# Patient Record
Sex: FEMALE | Race: BLACK OR AFRICAN AMERICAN | NOT HISPANIC OR LATINO | Employment: UNEMPLOYED | URBAN - METROPOLITAN AREA
[De-identification: names, ages, dates, MRNs, and addresses within clinical notes are randomized per-mention and may not be internally consistent; named-entity substitution may affect disease eponyms.]

---

## 2017-01-06 ENCOUNTER — ALLSCRIPTS OFFICE VISIT (OUTPATIENT)
Dept: OTHER | Facility: OTHER | Age: 57
End: 2017-01-06

## 2017-01-06 DIAGNOSIS — Z12.31 ENCOUNTER FOR SCREENING MAMMOGRAM FOR MALIGNANT NEOPLASM OF BREAST: ICD-10-CM

## 2017-05-27 ENCOUNTER — HOSPITAL ENCOUNTER (EMERGENCY)
Facility: HOSPITAL | Age: 57
Discharge: HOME | End: 2017-05-27
Attending: EMERGENCY MEDICINE | Admitting: EMERGENCY MEDICINE

## 2017-05-27 VITALS
OXYGEN SATURATION: 98 % | HEART RATE: 70 BPM | SYSTOLIC BLOOD PRESSURE: 153 MMHG | TEMPERATURE: 96.9 F | RESPIRATION RATE: 16 BRPM | DIASTOLIC BLOOD PRESSURE: 91 MMHG

## 2017-05-27 DIAGNOSIS — I10 HYPERTENSION: Primary | ICD-10-CM

## 2017-05-27 DIAGNOSIS — R51.9 HEADACHE: ICD-10-CM

## 2017-05-27 LAB
BACTERIA UR QL AUTO: ABNORMAL /HPF
BILIRUB UR QL STRIP: NEGATIVE
CLARITY UR: CLEAR
COLOR UR: YELLOW
GLUCOSE SERPL-MCNC: 105 MG/DL (ref 65–140)
GLUCOSE UR STRIP-MCNC: NEGATIVE MG/DL
HGB UR QL STRIP.AUTO: ABNORMAL
KETONES UR STRIP-MCNC: NEGATIVE MG/DL
LEUKOCYTE ESTERASE UR QL STRIP: ABNORMAL
NITRITE UR QL STRIP: NEGATIVE
NON-SQ EPI CELLS URNS QL MICRO: ABNORMAL /HPF
PH UR STRIP.AUTO: 6.5 [PH] (ref 4.5–8)
PROT UR STRIP-MCNC: NEGATIVE MG/DL
RBC #/AREA URNS AUTO: ABNORMAL /HPF
SP GR UR STRIP.AUTO: 1.02 (ref 1–1.03)
UROBILINOGEN UR QL STRIP.AUTO: 0.2 E.U./DL
WBC #/AREA URNS AUTO: ABNORMAL /HPF

## 2017-05-27 PROCEDURE — 81002 URINALYSIS NONAUTO W/O SCOPE: CPT | Performed by: EMERGENCY MEDICINE

## 2017-05-27 PROCEDURE — 99283 EMERGENCY DEPT VISIT LOW MDM: CPT

## 2017-05-27 PROCEDURE — 82948 REAGENT STRIP/BLOOD GLUCOSE: CPT

## 2017-05-27 PROCEDURE — 81001 URINALYSIS AUTO W/SCOPE: CPT

## 2017-05-27 RX ORDER — ATENOLOL 25 MG/1
25 TABLET ORAL DAILY
Qty: 30 TABLET | Refills: 0 | Status: SHIPPED | OUTPATIENT
Start: 2017-05-27 | End: 2017-06-12 | Stop reason: ALTCHOICE

## 2017-06-12 ENCOUNTER — HOSPITAL ENCOUNTER (EMERGENCY)
Facility: HOSPITAL | Age: 57
Discharge: HOME | End: 2017-06-12
Attending: EMERGENCY MEDICINE | Admitting: EMERGENCY MEDICINE
Payer: COMMERCIAL

## 2017-06-12 VITALS
TEMPERATURE: 98 F | DIASTOLIC BLOOD PRESSURE: 77 MMHG | HEART RATE: 62 BPM | OXYGEN SATURATION: 99 % | WEIGHT: 200 LBS | SYSTOLIC BLOOD PRESSURE: 172 MMHG | RESPIRATION RATE: 16 BRPM

## 2017-06-12 DIAGNOSIS — Z76.0 ENCOUNTER FOR MEDICATION REFILL: Primary | ICD-10-CM

## 2017-06-12 DIAGNOSIS — I10 CHRONIC HYPERTENSION: ICD-10-CM

## 2017-06-12 PROCEDURE — 99283 EMERGENCY DEPT VISIT LOW MDM: CPT

## 2017-06-12 RX ORDER — ATENOLOL AND CHLORTHALIDONE TABLET 100; 25 MG/1; MG/1
1 TABLET ORAL DAILY
COMMUNITY
End: 2017-06-12

## 2017-06-12 RX ORDER — ATENOLOL AND CHLORTHALIDONE TABLET 100; 25 MG/1; MG/1
1 TABLET ORAL DAILY
Qty: 14 TABLET | Refills: 0 | Status: SHIPPED | OUTPATIENT
Start: 2017-06-12 | End: 2017-06-26

## 2018-01-11 NOTE — PROGRESS NOTES
Assessment    1  Positive PPD (245 51) (R76 11)    Plan  Positive PPD    · * XR CHEST PA & LATERAL; Status:Active; Requested for:30Mar2016;    · Follow-up PRN Evaluation and Treatment  Follow-up  Status: Complete  Done:  [id number]    Chief Complaint  Pt is here for physical exam for work  Needs script for chest xray for positive PPD on left forearm  Pt states she takes Atenolol with diuretic ? History of Present Illness  HPI: Sees Endocrine in [facility] and he Rx BP med  Had a positive PPD and needs a CXR  PPD always com back positive since 1976  Work PE form and needs CXR  Review of Systems    Constitutional: No fever, no chills, feels well, no tiredness, no recent weight gain or weight loss  Eyes: No complaints of eye pain, no red eyes, no eyesight problems, no discharge, no dry eyes, no itching of eyes  ENT: no complaints of earache, no loss of hearing, no nose bleeds, no nasal discharge, no sore throat, no hoarseness  Cardiovascular: No complaints of slow heart rate, no fast heart rate, no chest pain, no palpitations, no leg claudication, no lower extremity edema  Respiratory: No complaints of shortness of breath, no wheezing, no cough, no SOB on exertion, no orthopnea, no PND  Gastrointestinal: No complaints of abdominal pain, no constipation, no nausea or vomiting, no diarrhea, no bloody stools  Genitourinary: No complaints of dysuria, no incontinence, no pelvic pain, no dysmenorrhea, no vaginal discharge or bleeding  Musculoskeletal: No complaints of arthralgias, no myalgias, no joint swelling or stiffness, no limb pain or swelling  Integumentary: No complaints of skin rash or lesions, no itching, no skin wounds, no breast pain or lump  Neurological: No complaints of headache, no confusion, no convulsions, no numbness, no dizziness or fainting, no tingling, no limb weakness, no difficulty walking     Psychiatric: Not suicidal, no sleep disturbance, no anxiety or depression, no change in personality, no emotional problems  Endocrine: No complaints of proptosis, no hot flashes, no muscle weakness, no deepening of the voice, no feelings of weakness  Hematologic/Lymphatic: No complaints of swollen glands, no swollen glands in the neck, does not bleed easily, does not bruise easily  Active Problems    1  Asthma (493 90) (J45 909)   2  Benign essential hypertension (401 1) (I10)   3  DMII (diabetes mellitus, type 2) (250 00) (E11 9)   4  Ear ache (388 70) (H92 09)   5  Esophageal reflux (530 81) (K21 9)   6  Headache (784 0) (R51)   7  Hypercholesteremia (272 0) (E78 0)   8  Hypochloremia (276 9) (E87 8)   9  Hypokalemia (276 8) (E87 6)   10  Plantar fasciitis (728 71) (M72 2)   11  Shingles (053 9) (B02 9)   12  Vitamin D deficiency (268 9) (E55 9)    Past Medical History    · History of Asthma with acute exacerbation (493 92) (J45 901)   · History of Biceps tendonitis, unspecified laterality (726 12) (M75 20)   · History of Depression (311) (F32 9)   · History of Drug dependence (304 90) (F19 20)   · History of bronchitis (V12 69) (Z87 09)   · History of migraine (V12 49) (Z86 69)   · History of Joint pain, knee (719 46) (M25 569)   · History of Knee Sprain (844 9)   · History of Neck pain (723 1) (M54 2)   · History of Shoulder joint pain, unspecified laterality    Surgical History    · History of  Section   · History of Hysterectomy   · History of Incision And Drainage Of Skin Abscess Hand   · History of Tonsillectomy    Family History    · Family history of Hypertension (V17 49)    · Family history of Diabetes Mellitus (V18 0)   · Family history of Hypertension (V17 49)    Social History    · Never A Smoker   · Never Drank Alcohol    Current Meds   1  MetFORMIN HCl - 500 MG Oral Tablet; TAKE 1 TABLET TWICE DAILY; Therapy: (Recorded:2016) to Recorded    Allergies    1  Sulfa Drugs   2   Flagyl CAPS    3  Seasonal    Vitals   Recorded: 45HKB5567 12:23PM   Temperature 98 F, Oral   Heart Rate 90   Respiration 15   Systolic 672   Diastolic 78   Height 5 ft 4 in   Weight 191 lb    BMI Calculated 32 79   BSA Calculated 1 92   O2 Saturation 98     Physical Exam    Constitutional   General appearance: No acute distress, well appearing and well nourished  Eyes   Conjunctiva and lids: No swelling, erythema or discharge  Pupils and irises: Equal, round and reactive to light  Ears, Nose, Mouth, and Throat   External inspection of ears and nose: Normal     Otoscopic examination: Tympanic membranes translucent with normal light reflex  Canals patent without erythema  Oropharynx: Normal with no erythema, edema, exudate or lesions  Pulmonary   Respiratory effort: No increased work of breathing or signs of respiratory distress  Auscultation of lungs: Clear to auscultation  Cardiovascular   Palpation of heart: Normal PMI, no thrills  Auscultation of heart: Normal rate and rhythm, normal S1 and S2, without murmurs  Examination of extremities for edema and/or varicosities: Normal     Abdomen   Abdomen: Non-tender, no masses  Liver and spleen: No hepatomegaly or splenomegaly  Lymphatic   Palpation of lymph nodes in neck: No lymphadenopathy  Musculoskeletal   Gait and station: Normal     Digits and nails: Normal without clubbing or cyanosis  Inspection/palpation of joints, bones, and muscles: Normal     Skin   Skin and subcutaneous tissue: Normal without rashes or lesions  Neurologic   Reflexes: 2+ and symmetric  Psychiatric   Orientation to person, place, and time: Normal     Mood and affect: Normal        Health Management  Benign essential hypertension   (1) COMPREHENSIVE METABOLIC PANEL; every 1 year; Next Due: 42MWT3019; Overdue  (1) LIPID PANEL, FASTING; every 1 year; Next Due: 27NIC5802; Overdue  EKG/ECG- POC; every 1 year; Next Due: 53ICD6530; Overdue  DMII (diabetes mellitus, type 2)   (1) HEMOGLOBIN A1C; every 3 months;  Next Due: 66BMF9700; Overdue  (1) MICROALBUMIN CREATININE RATIO, RANDOM URINE; every 1 year; Next Due:  12VUZ2350; Overdue  *VB - Eye Exam; every 1 year; Next Due: 41BYE2192; Overdue  *VB-Foot Exam; every 1 year; Next Due: 49KGI6404; Overdue  Blood Glucose- POC; every 3 months; Last 86WXJ8084; Next Due: 03Asj2877; Overdue  Urine Dip Non-Automated- POC; every 3 months; Next Due: 14HDN8834;  Overdue    Signatures   Electronically signed by : Virginia Salvador DO; Mar 30 2016  1:50PM EST                       (Author)

## 2018-01-14 VITALS
BODY MASS INDEX: 33.29 KG/M2 | HEIGHT: 64 IN | SYSTOLIC BLOOD PRESSURE: 136 MMHG | RESPIRATION RATE: 16 BRPM | WEIGHT: 195 LBS | DIASTOLIC BLOOD PRESSURE: 78 MMHG | TEMPERATURE: 98.2 F | HEART RATE: 67 BPM

## 2018-01-16 NOTE — MISCELLANEOUS
Message    Date: 04/12/2016 03:40 PM,   Pt called to request for her chest xray result to be faxed to [address] [place] [address] Drive: Dr Omi Norton at [phone]  Ok to fax? Mayra Padilla faxed chest xray results through [employer] per Dr Dexter Miller instructions  VR/vfp        Active Problems    1  Asthma (493 90) (J45 909)   2  Benign essential hypertension (401 1) (I10)   3  DMII (diabetes mellitus, type 2) (250 00) (E11 9)   4  Ear ache (388 70) (H92 09)   5  Esophageal reflux (530 81) (K21 9)   6  Headache (784 0) (R51)   7  Hypercholesteremia (272 0) (E78 0)   8  Hypochloremia (276 9) (E87 8)   9  Hypokalemia (276 8) (E87 6)   10  Plantar fasciitis (728 71) (M72 2)   11  Positive PPD (795 51) (R76 11)   12  Shingles (053 9) (B02 9)   13  Vitamin D deficiency (268 9) (E55 9)    Current Meds   1  MetFORMIN HCl - 500 MG Oral Tablet; TAKE 1 TABLET TWICE DAILY; Therapy: (Recorded:30Mar2016) to Recorded    Allergies    1  Sulfa Drugs   2  Flagyl CAPS    3  Seasonal    Signatures   Electronically signed by : Litzy Pruett DO;  Apr 14 2016  8:07AM EST                       (Author)

## 2018-09-23 ENCOUNTER — HOSPITAL ENCOUNTER (EMERGENCY)
Facility: HOSPITAL | Age: 58
Discharge: HOME | End: 2018-09-23
Attending: EMERGENCY MEDICINE | Admitting: EMERGENCY MEDICINE

## 2018-09-23 ENCOUNTER — APPOINTMENT (EMERGENCY)
Dept: RADIOLOGY | Facility: HOSPITAL | Age: 58
End: 2018-09-23

## 2018-09-23 VITALS
BODY MASS INDEX: 34.33 KG/M2 | WEIGHT: 200 LBS | DIASTOLIC BLOOD PRESSURE: 60 MMHG | TEMPERATURE: 97.9 F | HEART RATE: 75 BPM | SYSTOLIC BLOOD PRESSURE: 129 MMHG | RESPIRATION RATE: 19 BRPM | OXYGEN SATURATION: 98 %

## 2018-09-23 DIAGNOSIS — M25.462 PAIN AND SWELLING OF LEFT KNEE: ICD-10-CM

## 2018-09-23 DIAGNOSIS — M25.562 PAIN AND SWELLING OF LEFT KNEE: ICD-10-CM

## 2018-09-23 DIAGNOSIS — M25.562 LEFT KNEE PAIN: Primary | ICD-10-CM

## 2018-09-23 PROCEDURE — 99283 EMERGENCY DEPT VISIT LOW MDM: CPT

## 2018-09-23 PROCEDURE — 96372 THER/PROPH/DIAG INJ SC/IM: CPT

## 2018-09-23 PROCEDURE — 73562 X-RAY EXAM OF KNEE 3: CPT

## 2018-09-23 RX ORDER — ALBUTEROL SULFATE 90 UG/1
2 AEROSOL, METERED RESPIRATORY (INHALATION) EVERY 6 HOURS
COMMUNITY
Start: 2018-04-25 | End: 2019-04-25

## 2018-09-23 RX ORDER — KETOROLAC TROMETHAMINE 30 MG/ML
15 INJECTION, SOLUTION INTRAMUSCULAR; INTRAVENOUS ONCE
Status: COMPLETED | OUTPATIENT
Start: 2018-09-23 | End: 2018-09-23

## 2018-09-23 RX ORDER — LISINOPRIL 5 MG/1
5 TABLET ORAL
COMMUNITY
Start: 2018-04-25 | End: 2019-04-25

## 2018-09-23 RX ORDER — NAPROXEN 500 MG/1
500 TABLET ORAL 2 TIMES DAILY WITH MEALS
Qty: 10 TABLET | Refills: 0 | Status: SHIPPED | OUTPATIENT
Start: 2018-09-23 | End: 2018-09-28

## 2018-09-23 RX ORDER — OMEPRAZOLE 20 MG/1
20 CAPSULE, DELAYED RELEASE ORAL
COMMUNITY
Start: 2018-04-25 | End: 2019-04-25

## 2018-09-23 RX ORDER — ATORVASTATIN CALCIUM 10 MG/1
10 TABLET, FILM COATED ORAL
COMMUNITY
Start: 2018-04-25 | End: 2019-04-25

## 2018-09-23 RX ORDER — CALCIUM POLYCARBOPHIL 625 MG 625 MG/1
625 TABLET ORAL
COMMUNITY
Start: 2018-04-25 | End: 2019-04-25

## 2018-09-23 RX ORDER — ACETAMINOPHEN 325 MG/1
650 TABLET ORAL ONCE
Status: COMPLETED | OUTPATIENT
Start: 2018-09-23 | End: 2018-09-23

## 2018-09-23 RX ORDER — FLUTICASONE PROPIONATE 50 MCG
SPRAY, SUSPENSION (ML) NASAL
COMMUNITY
Start: 2018-08-27

## 2018-09-23 RX ADMIN — ACETAMINOPHEN 650 MG: 325 TABLET, FILM COATED ORAL at 20:27

## 2018-09-23 RX ADMIN — KETOROLAC TROMETHAMINE 15 MG: 30 INJECTION, SOLUTION INTRAMUSCULAR at 20:27

## 2018-09-24 NOTE — DISCHARGE INSTRUCTIONS
Arthralgia   WHAT YOU NEED TO KNOW:   Arthralgia is pain in one or more joints, with no inflammation  It may be short-term and get better within 6 to 8 weeks  Arthralgia can be an early sign of arthritis  Arthralgia may be caused by a medical condition, such as a hormone disorder or a tumor  It may also be caused by an infection or injury  DISCHARGE INSTRUCTIONS:   Medicines: The following medicines may  be ordered for you:  · Acetaminophen  decreases pain  Ask how much to take and how often to take it  Follow directions  Acetaminophen can cause liver damage if not taken correctly  · NSAIDs  decrease pain and prevent swelling  Ask your healthcare provider which medicine is right for you  Ask how much to take and when to take it  Take as directed  NSAIDs can cause stomach bleeding and kidney problems if not taken correctly  · Pain relief cream  decreases pain  Use this cream as directed  · Take your medicine as directed  Contact your healthcare provider if you think your medicine is not helping or if you have side effects  Tell him of her if you are allergic to any medicine  Keep a list of the medicines, vitamins, and herbs you take  Include the amounts, and when and why you take them  Bring the list or the pill bottles to follow-up visits  Carry your medicine list with you in case of an emergency  Follow up with your healthcare provider or specialist as directed:  Write down your questions so you remember to ask them during your visits  Self-care:   · Apply heat  to help decrease pain  Use a heating pad or heat wrap  Apply heat for 20 to 30 minutes every 2 hours for as many days as directed  · Rest  as much as possible  Avoid activities that cause joint pain  · Apply ice  to help decrease swelling and pain  Ice may also help prevent tissue damage  Use an ice pack, or put crushed ice in a plastic bag   Cover it with a towel and place it on your painful joint for 15 to 20 minutes every hour or as directed  · Support  the joint with a brace or elastic wrap as directed  · Elevate  your joint above the level of your heart as often as you can to help decrease swelling and pain  Prop your painful joint on pillows or blankets to keep it elevated comfortably  · Lose weight  if you are overweight  Extra weight can put pressure on your joints and cause more pain  Ask your healthcare provider how much you should weigh  Ask him to help you create a weight loss plan  · Exercise  regularly to help improve joint movement and to decrease pain  Ask about the best exercise plan for you  Low-impact exercises can help take the pressure off your joints  Examples are walking, swimming, and water aerobics  Physical therapy:  A physical therapist teaches you exercises to help improve movement and strength, and to decrease pain  Ask your healthcare provider if physical therapy is right for you  Contact your healthcare provider or specialist if:   · You have a fever  · You continue to have joint pain that cannot be relieved with heat, ice, or medicine  · You have pain and inflammation around your joint  · You have questions or concerns about your condition or care  Return to the emergency department if:   · You have sudden, severe pain when you move your joint  · You have a fever and shaking chills  · You cannot move your joint  · You lose feeling on the side of your body where you have the painful joint  © 2017 [address]  Information is for End User's use only and may not be sold, redistributed or otherwise used for commercial purposes  All illustrations and images included in CareNotes® are the copyrighted property of A D A M [employer] Inc  or [employer]  The above information is an  only  It is not intended as medical advice for individual conditions or treatments   Talk to your doctor, nurse or pharmacist before following any medical regimen to see if it is safe and effective for you  Swollen Joint, Ambulatory Care   GENERAL INFORMATION:   Joint swelling  may occur in one or more joints  You may have other symptoms, such as pain, tenderness, or stiffness  A swollen joint may be caused by a variety of conditions such as arthritis, pseudogout, gout, tendinitis, or injury  Seek immediate care for the following symptoms:   · You cannot move your joint at all  · You have severe pain that does not get better with medicine  Treatment for a swollen joint  depends on the cause of your swollen joint  Your healthcare provider may recommend any of the following:  · Rest  your swollen joint  Avoid activities that make the swelling or pain worse  You may need to avoid putting weight on your joint while you have pain  Crutches or a walker can be used to avoid putting weight on joints in your lower body  · Apply ice  on your swollen joint for 15 to 20 minutes every hour or as directed  Use an ice pack, or put crushed ice in a plastic bag  Cover it with a towel  Ice helps prevent tissue damage and decreases swelling and pain  · Apply heat  on your swollen joint for 20 to 30 minutes every 2 hours for as many days as directed  Heat helps decrease pain  · Elevate  your swollen joint above the level of your heart as often as you can  This will help decrease swelling and pain  Prop your joint on pillows or blankets to keep it elevated comfortably  · NSAIDs  help decrease swelling and pain or fever  This medicine is available with or without a doctor's order  NSAIDs can cause stomach bleeding or kidney problems in certain people  If you take blood thinner medicine, always ask your healthcare provider if NSAIDs are safe for you  Always read the medicine label and follow directions  Follow up with your healthcare provider as directed:  Write down your questions so you remember to ask them during your visits     CARE AGREEMENT:   You have the right to help plan your care  Learn about your health condition and how it may be treated  Discuss treatment options with your caregivers to decide what care you want to receive  You always have the right to refuse treatment  The above information is an  only  It is not intended as medical advice for individual conditions or treatments  Talk to your doctor, nurse or pharmacist before following any medical regimen to see if it is safe and effective for you  © 2014 [address] is for End User's use only and may not be sold, redistributed or otherwise used for commercial purposes  All illustrations and images included in CareNotes® are the copyrighted property of [employer]  or [employer]

## 2018-09-24 NOTE — ED ATTENDING ATTESTATION
Jenna Yuan MD, saw and evaluated the patient  All available labs and X-rays were ordered by me or the resident and have been reviewed by myself  I discussed the patient with the resident / non-physician and agree with the resident's / non-physician practitioner's findings and plan as documented in the resident's / non-physician practicitioner's note, except where noted  At this point, I agree with the current assessment done in the ED  Chief Complaint   Patient presents with    Leg Swelling     Pain, swelling to LLE for 2 days  No known injury  This is a 65-year-old female presenting for evaluation of atraumatic left knee pain with swelling  The last 2 days she has been noticing that there is this persistent pain on the medial aspect of her left knee  She states that she woke up but 2 days ago with the pain, it would get better during the course of the day but then when she sleeps again wakes up in the morning it is much worse  No history of arthritis  Tylenol/motrin medications used with mild relief  She has noticed chronic lower extremity swelling bilaterally equal, not particularly worse in the last couple days  Because the symptoms are continuing she come in for evaluation  Denies fevers chills night sweats nausea vomiting chest pain shortness of breath  The pain is worse with movement, better with rest   If she stays perfectly still with her leg straight she has minimal pain  Denies any urinary symptoms  Denies history of gout  Denies new type of diet  PMH:  - HTN  - DM  - HLD  PSH:  - Hysterectomy  -   No smoking, drinking, drugs   PE:  Vitals:    18 1910 18 2117   BP: 153/68 129/60   BP Location: Right arm Right arm   Pulse: 87 75   Resp: 18 19   Temp: 97 9 °F (36 6 °C)    TempSrc: Temporal    SpO2: 99% 98%   Weight: 90 7 kg (200 lb)    General: VSS, NAD, awake, alert  Well-nourished, well-developed  Appears stated age     Speaking normally in full sentences  Head: Normocephalic, atraumatic, nontender  Eyes: PERRL, EOM-I  No diplopia  No hyphema  No subconjunctival hemorrhages  Symmetrical lids  ENT: Atraumatic external nose and ears  MMM  No malocclusion  No stridor  Normal phonation  No drooling  Normal swallowing  Neck: Symmetric, trachea midline  No JVD  CV: RRR  +S1/S2  No murmurs or gallops  Peripheral pulses +2 throughout  No chest wall tenderness  Lungs:   Unlabored No retractions  CTAB, lungs sounds equal bilateral    No tachypnea  Abd: +BS, soft, NT/ND    MSK:   FROM   EHL/FHL/PF/DF/KF/KE/HF/HE 5/5  No saddle anesthesia  2+ patellar reflexes  Knee: AD/PD/Varus/Valgus/Lachman 5/5 without demonstration of joint laxity  When I was doing Veress in valgus, the fact that my palm was pressing on the medial aspect of the knee elicits significant pain  There was no distal swelling that I can appreciate in the ankles or feet  Pain with movement  Tiny effusion  Back:   No rashes  Skin: Dry, intact  Neuro: AAOx3, GCS 15, CN II-XII grossly intact  Motor grossly intact  Psychiatric/Behavioral: Appropriate mood and affect   Exam: deferred  A:  - Knee pain  P:  - supportive measures  - XR for fx   - 13 point ROS was performed and all are normal unless stated in the history above  - Nursing note reviewed  Vitals reviewed  - Orders placed by myself and/or advanced practitioner / resident     - Previous chart was reviewed  - No language barrier    - History obtained from patient  - There are no limitations to the history obtained  - Critical care time: Not applicable for this patient  Final Diagnosis:  1  Left knee pain    2   Pain and swelling of left knee         Medications   ketorolac (TORADOL) injection 15 mg (15 mg Intramuscular Given 9/23/18 2027)   acetaminophen (TYLENOL) tablet 650 mg (650 mg Oral Given 9/23/18 2027)     XR knee 3 views left non injury   ED Interpretation   Medial osteoarthritis, patellar femoral accessory arthritis      Final Result      No acute osseous abnormality  Workstation performed: [id number]           Orders Placed This Encounter   Procedures    XR knee 3 views left non injury     Labs Reviewed - No data to display  Time reflects when diagnosis was documented in both MDM as applicable and the Disposition within this note     Time User Action Codes Description Comment    9/23/2018  9:38 PM Cyndee Porras Add [M25 562] Left knee pain     9/23/2018  9:38 PM Cristino Sherman Add [Q04 513,  M29 762] Pain and swelling of left knee       ED Disposition     ED Disposition Condition Comment    Discharge  Jeremi Esparza discharge to home/self care  Condition at discharge: Good    Return precautions were discussed with patient  Patient understands when to return to  Emergency department  Patient agrees to discharge plan and follow up care             Follow-up Information     Follow up With Specialties Details Why Contact Info Additional Information    Russel Jeter MD Internal Medicine Go in 2 days  [place] [id number]  [facility] Emergency Department Emergency Medicine Go to As needed, If symptoms worsen [address] [place] ED, [address]  , [facility] [place], [address] [facility] Go in 1 week  [facility] [address] [phone]         Discharge Medication List as of 9/23/2018  9:50 PM      START taking these medications    Details   naproxen (NAPROSYN) 500 mg tablet Take 1 tablet (500 mg total) by mouth 2 (two) times a day with meals for 5 days, Starting Sun 9/23/2018, Until Fri 9/28/2018, Print         CONTINUE these medications which have NOT CHANGED    Details   albuterol (PROVENTIL HFA,VENTOLIN HFA) 90 mcg/act inhaler Inhale 2 puffs every 6 (six) hours, Starting PEYTON Resendiz 4/25/2018, Until Thu 4/25/2019, Historical Med      atorvastatin (LIPITOR) 10 mg tablet Take 10 mg by mouth, Starting Wed 4/25/2018, Until Thu 4/25/2019, Historical Med      calcium polycarbophil (FIBERCON) 625 mg tablet Take 625 mg by mouth, Starting Wed 4/25/2018, Until Thu 4/25/2019, Historical Med      fluticasone (FLONASE) 50 mcg/act nasal spray instill 2 sprays into each nostril once daily, Historical Med      Linaclotide (LINZESS) 72 MCG CAPS Take by mouth, Historical Med      lisinopril (ZESTRIL) 5 mg tablet Take 5 mg by mouth, Starting Wed 4/25/2018, Until Thu 4/25/2019, Historical Med      omeprazole (PRILOSEC) 20 mg delayed release capsule Take 20 mg by mouth, Starting Wed 4/25/2018, Until Thu 4/25/2019, Historical Med      atenolol-chlorthalidone (TENORETIC) 100-25 mg per tablet Take 1 tablet by mouth daily for 14 days, Starting Mon 6/12/2017, Until Mon 6/26/2017, Print      metFORMIN (GLUCOPHAGE) 500 mg tablet Take 500 mg by mouth 2 (two) times a day, Historical Med           No discharge procedures on file  Prior to Admission Medications   Prescriptions Last Dose Informant Patient Reported? Taking?    Linaclotide (LINZESS) 72 MCG CAPS   Yes Yes   Sig: Take by mouth   albuterol (PROVENTIL HFA,VENTOLIN HFA) 90 mcg/act inhaler   Yes Yes   Sig: Inhale 2 puffs every 6 (six) hours   atenolol-chlorthalidone (TENORETIC) 100-25 mg per tablet   No No   Sig: Take 1 tablet by mouth daily for 14 days   atorvastatin (LIPITOR) 10 mg tablet   Yes Yes   Sig: Take 10 mg by mouth   calcium polycarbophil (FIBERCON) 625 mg tablet   Yes Yes   Sig: Take 625 mg by mouth   fluticasone (FLONASE) 50 mcg/act nasal spray   Yes Yes   Sig: instill 2 sprays into each nostril once daily   lisinopril (ZESTRIL) 5 mg tablet   Yes Yes   Sig: Take 5 mg by mouth   metFORMIN (GLUCOPHAGE) 500 mg tablet   Yes No   Sig: Take 500 mg by mouth 2 (two) times a day   omeprazole (PRILOSEC) 20 mg delayed release capsule   Yes Yes   Sig: Take 20 mg by mouth      Facility-Administered Medications: None       Portions of the record may have been created with voice recognition software  Occasional wrong word or "sound a like" substitutions may have occurred due to the inherent limitations of voice recognition software  Read the chart carefully and recognize, using context, where substitutions have occurred      Electronically signed by:  Sam Brower

## 2018-09-24 NOTE — ED PROVIDER NOTES
History  Chief Complaint   Patient presents with    Leg Swelling     Pain, swelling to LLE for 2 days  No known injury  HPI     58-year-old female presenting with left knee swelling for 2 days  Patient states she does have a history of arthritis in her back  Patient experience left knee pain  Pain is worse in the morning and in the knee warmed up throughout the day  She admits the medial pain  Currently the pain is a 5/10 worse with walking, 7/10  Patient denies fever chills rigors  Patient states she does have a history of bilateral lower extremity swelling which comes and goes this has been going on for years  No history DVT or PE in her past   Patient denies trauma to the area  Patient denies overlying skin changes  Pain is located medially and radiates to her popliteal fossa  Patient denies fever chills rigors headache lightheadedness dizziness chest pain palpitations shortness of breath cough pleurisy abdominal pain nausea vomiting diarrhea constipation urinary symptoms motor weakness numbness  Prior to Admission Medications   Prescriptions Last Dose Informant Patient Reported? Taking?    Linaclotide (LINZESS) 72 MCG CAPS   Yes Yes   Sig: Take by mouth   albuterol (PROVENTIL HFA,VENTOLIN HFA) 90 mcg/act inhaler   Yes Yes   Sig: Inhale 2 puffs every 6 (six) hours   atenolol-chlorthalidone (TENORETIC) 100-25 mg per tablet   No No   Sig: Take 1 tablet by mouth daily for 14 days   atorvastatin (LIPITOR) 10 mg tablet   Yes Yes   Sig: Take 10 mg by mouth   calcium polycarbophil (FIBERCON) 625 mg tablet   Yes Yes   Sig: Take 625 mg by mouth   fluticasone (FLONASE) 50 mcg/act nasal spray   Yes Yes   Sig: instill 2 sprays into each nostril once daily   lisinopril (ZESTRIL) 5 mg tablet   Yes Yes   Sig: Take 5 mg by mouth   metFORMIN (GLUCOPHAGE) 500 mg tablet   Yes No   Sig: Take 500 mg by mouth 2 (two) times a day   omeprazole (PRILOSEC) 20 mg delayed release capsule   Yes Yes   Sig: Take 20 mg by mouth      Facility-Administered Medications: None       Past Medical History:   Diagnosis Date    Diabetes mellitus ([address] )     Hyperlipidemia     Hypertension        Past Surgical History:   Procedure Laterality Date     SECTION      HYSTERECTOMY         History reviewed  No pertinent family history  I have reviewed and agree with the history as documented  Social History   Substance Use Topics    Smoking status: Never Smoker    Smokeless tobacco: Never Used    Alcohol use No        Review of Systems   Musculoskeletal: Positive for arthralgias and joint swelling  Negative for back pain, gait problem, myalgias, neck pain and neck stiffness  Skin: Negative for color change, pallor, rash and wound  All other systems reviewed and are negative  Physical Exam  ED Triage Vitals [18]   Temperature Pulse Respirations Blood Pressure SpO2   97 9 °F (36 6 °C) 87 18 153/68 99 %      Temp Source Heart Rate Source Patient Position - Orthostatic VS BP Location FiO2 (%)   Temporal Monitor Sitting Right arm --      Pain Score       8           Orthostatic Vital Signs  Vitals:    18   BP: 153/68 129/60   Pulse: 87 75   Patient Position - Orthostatic VS: Sitting Lying       Physical Exam   Constitutional: She is oriented to person, place, and time  She appears well-developed and well-nourished  No distress  HENT:   Head: Normocephalic and atraumatic  Right Ear: External ear normal    Left Ear: External ear normal    Nose: Nose normal    Mouth/Throat: Oropharynx is clear and moist  No oropharyngeal exudate  Eyes: Conjunctivae and EOM are normal  Pupils are equal, round, and reactive to light  Right eye exhibits no discharge  Left eye exhibits no discharge  No scleral icterus  Neck: Normal range of motion  Neck supple  No JVD present  No tracheal deviation present  No thyromegaly present     Cardiovascular: Normal rate, regular rhythm, S1 normal, S2 normal, normal heart sounds and intact distal pulses  No murmur heard  Pulses:       Dorsalis pedis pulses are 2+ on the right side, and 2+ on the left side  Posterior tibial pulses are 2+ on the right side, and 2+ on the left side  Pulmonary/Chest: Effort normal and breath sounds normal  No stridor  No respiratory distress  She has no wheezes  Abdominal: Soft  Bowel sounds are normal  She exhibits no distension and no mass  There is no tenderness  There is no rebound and no guarding  No hernia  Musculoskeletal: Normal range of motion  She exhibits no edema or deformity  Right knee: She exhibits normal range of motion, no swelling and no effusion  No tenderness found  No medial joint line, no lateral joint line, no MCL, no LCL and no patellar tendon tenderness noted  Left knee: She exhibits swelling  She exhibits normal range of motion and no effusion  Tenderness found  Medial joint line tenderness noted  No lateral joint line, no MCL, no LCL and no patellar tendon tenderness noted  Legs:  Homans sign negative bilaterally no calf erythema or edema, calves are symmetrical in diameter  Lymphadenopathy:     She has no cervical adenopathy  Neurological: She is alert and oriented to person, place, and time  She displays normal reflexes  No cranial nerve deficit  She exhibits normal muscle tone  Skin: Skin is warm and dry  No rash noted  She is not diaphoretic  No erythema  Psychiatric: She has a normal mood and affect  Her behavior is normal  Judgment and thought content normal    Nursing note and vitals reviewed        ED Medications  Medications   ketorolac (TORADOL) injection 15 mg (15 mg Intramuscular Given 9/23/18 2027)   acetaminophen (TYLENOL) tablet 650 mg (650 mg Oral Given 9/23/18 2027)       Diagnostic Studies  Results Reviewed     None                 XR knee 3 views left non injury   ED Interpretation by Hildegarde Severin, DO (09/23 2052)   Medial osteoarthritis, patellar femoral accessory arthritis            Procedures  Procedures      Phone Consults  ED Phone Contact    ED Course                               MDM  Number of Diagnoses or Management Options  Left knee pain:   Pain and swelling of left knee:   Diagnosis management comments: 71-year-old female presenting with left knee pain  Patient has mild effusion  Patient has no calf tenderness consider diagnosis of DVT clinically excluded  Patient has arthritic changes on x-ray  Patient given Toradol  Patient felt better  Impression left knee arthritis with effusion  No fracture seen no dislocation seen on x-ray  Patient will be started on naproxen  Orthopedic follow-up given  ED return precautions discussed  No signs of septic arthritis, patient feels constitutionally well  Patient is able to move knee, it is not locked  Doubt septic arthritis  CritCare Time    Disposition  Final diagnoses:   Left knee pain   Pain and swelling of left knee     Time reflects when diagnosis was documented in both MDM as applicable and the Disposition within this note     Time User Action Codes Description Comment    9/23/2018  9:38 PM Josefina Stack Add [M25 562] Left knee pain     9/23/2018  9:38 PM Jagjit Sherman Add [I40 214,  M22 462] Pain and swelling of left knee       ED Disposition     ED Disposition Condition Comment    Discharge  Marixa Cohen discharge to home/self care  Condition at discharge: Good    Return precautions were discussed with patient  Patient understands when to return to  Emergency department  Patient agrees to discharge plan and follow up care             Follow-up Information     Follow up With Specialties Details Why Contact Info Additional Information    Daniela Anglees MD Internal Medicine Go in 2 days  [place] [id number]  [address] [facility] Emergency Department Emergency Medicine Go to As needed, If symptoms worsen [address] [facility] [address] [facility] [address]   Ezekiel Carter [address] [place] [facility], [address] [facility]  , [address] [facility] [place], [address] [facility] [address] [facility] Orthopedic Surgery Go in 1 week  [facility] [id number] [phone]           Discharge Medication List as of 9/23/2018  9:50 PM      START taking these medications    Details   naproxen (NAPROSYN) 500 mg tablet Take 1 tablet (500 mg total) by mouth 2 (two) times a day with meals for 5 days, Starting Sun 9/23/2018, Until Fri 9/28/2018, Print         CONTINUE these medications which have NOT CHANGED    Details   albuterol (PROVENTIL HFA,VENTOLIN HFA) 90 mcg/act inhaler Inhale 2 puffs every 6 (six) hours, Starting Wed 4/25/2018, Until Thu 4/25/2019, Historical Med      atorvastatin (LIPITOR) 10 mg tablet Take 10 mg by mouth, Starting Wed 4/25/2018, Until Thu 4/25/2019, Historical Med      calcium polycarbophil (FIBERCON) 625 mg tablet Take 625 mg by mouth, Starting Wed 4/25/2018, Until Thu 4/25/2019, Historical Med      fluticasone (FLONASE) 50 mcg/act nasal spray instill 2 sprays into each nostril once daily, Historical Med      Linaclotide (LINZESS) 72 MCG CAPS Take by mouth, Historical Med      lisinopril (ZESTRIL) 5 mg tablet Take 5 mg by mouth, Starting Wed 4/25/2018, Until Thu 4/25/2019, Historical Med      omeprazole (PRILOSEC) 20 mg delayed release capsule Take 20 mg by mouth, Starting Wed 4/25/2018, Until Thu 4/25/2019, Historical Med      atenolol-chlorthalidone (TENORETIC) 100-25 mg per tablet Take 1 tablet by mouth daily for 14 days, Starting Mon 6/12/2017, Until Mon 6/26/2017, Print      metFORMIN (GLUCOPHAGE) 500 mg tablet Take 500 mg by mouth 2 (two) times a day, Historical Med           No discharge procedures on file  ED Provider  Attending physically available and evaluated Liz Kwan I managed the patient along with the ED Attending      Electronically Signed by         Rodríguez Farrell DO  09/24/18 0234 Statement Selected

## 2021-06-15 ENCOUNTER — APPOINTMENT (EMERGENCY)
Dept: RADIOLOGY | Facility: HOSPITAL | Age: 61
End: 2021-06-15
Payer: COMMERCIAL

## 2021-06-15 ENCOUNTER — HOSPITAL ENCOUNTER (EMERGENCY)
Facility: HOSPITAL | Age: 61
Discharge: HOME | End: 2021-06-15
Attending: EMERGENCY MEDICINE | Admitting: EMERGENCY MEDICINE
Payer: COMMERCIAL

## 2021-06-15 VITALS
HEART RATE: 83 BPM | SYSTOLIC BLOOD PRESSURE: 102 MMHG | TEMPERATURE: 98.8 F | RESPIRATION RATE: 20 BRPM | DIASTOLIC BLOOD PRESSURE: 52 MMHG | OXYGEN SATURATION: 100 %

## 2021-06-15 DIAGNOSIS — J06.9 URI (UPPER RESPIRATORY INFECTION): Primary | ICD-10-CM

## 2021-06-15 LAB
ATRIAL RATE: 81 BPM
P AXIS: 59 DEGREES
PR INTERVAL: 154 MS
QRS AXIS: 22 DEGREES
QRSD INTERVAL: 72 MS
QT INTERVAL: 372 MS
QTC INTERVAL: 432 MS
SARS-COV-2 RNA RESP QL NAA+PROBE: NEGATIVE
T WAVE AXIS: 9 DEGREES
VENTRICULAR RATE: 81 BPM

## 2021-06-15 PROCEDURE — U0003 INFECTIOUS AGENT DETECTION BY NUCLEIC ACID (DNA OR RNA); SEVERE ACUTE RESPIRATORY SYNDROME CORONAVIRUS 2 (SARS-COV-2) (CORONAVIRUS DISEASE [COVID-19]), AMPLIFIED PROBE TECHNIQUE, MAKING USE OF HIGH THROUGHPUT TECHNOLOGIES AS DESCRIBED BY CMS-2020-01-R: HCPCS | Performed by: EMERGENCY MEDICINE

## 2021-06-15 PROCEDURE — 93005 ELECTROCARDIOGRAM TRACING: CPT

## 2021-06-15 PROCEDURE — 96372 THER/PROPH/DIAG INJ SC/IM: CPT

## 2021-06-15 PROCEDURE — 71045 X-RAY EXAM CHEST 1 VIEW: CPT

## 2021-06-15 PROCEDURE — 99284 EMERGENCY DEPT VISIT MOD MDM: CPT

## 2021-06-15 PROCEDURE — U0005 INFEC AGEN DETEC AMPLI PROBE: HCPCS | Performed by: EMERGENCY MEDICINE

## 2021-06-15 PROCEDURE — 99285 EMERGENCY DEPT VISIT HI MDM: CPT | Performed by: EMERGENCY MEDICINE

## 2021-06-15 PROCEDURE — 93010 ELECTROCARDIOGRAM REPORT: CPT | Performed by: INTERNAL MEDICINE

## 2021-06-15 RX ORDER — KETOROLAC TROMETHAMINE 30 MG/ML
30 INJECTION, SOLUTION INTRAMUSCULAR; INTRAVENOUS ONCE
Status: COMPLETED | OUTPATIENT
Start: 2021-06-15 | End: 2021-06-15

## 2021-06-15 RX ORDER — ALBUTEROL SULFATE 90 UG/1
2 AEROSOL, METERED RESPIRATORY (INHALATION) ONCE
Status: COMPLETED | OUTPATIENT
Start: 2021-06-15 | End: 2021-06-15

## 2021-06-15 RX ADMIN — KETOROLAC TROMETHAMINE 30 MG: 30 INJECTION, SOLUTION INTRAMUSCULAR; INTRAVENOUS at 16:06

## 2021-06-15 RX ADMIN — ALBUTEROL SULFATE 2 PUFF: 90 AEROSOL, METERED RESPIRATORY (INHALATION) at 16:07

## 2021-06-18 ENCOUNTER — APPOINTMENT (OUTPATIENT)
Dept: LAB | Facility: HOSPITAL | Age: 61
End: 2021-06-18
Payer: COMMERCIAL

## 2021-06-18 DIAGNOSIS — E11.69 TYPE 2 DIABETES MELLITUS WITH OTHER SPECIFIED COMPLICATION, WITHOUT LONG-TERM CURRENT USE OF INSULIN (HCC): ICD-10-CM

## 2021-06-18 LAB
ALBUMIN SERPL BCP-MCNC: 4.1 G/DL (ref 3.5–5)
ALP SERPL-CCNC: 121 U/L (ref 46–116)
ALT SERPL W P-5'-P-CCNC: 47 U/L (ref 12–78)
ANION GAP SERPL CALCULATED.3IONS-SCNC: 9 MMOL/L (ref 4–13)
AST SERPL W P-5'-P-CCNC: 15 U/L (ref 5–45)
BILIRUB SERPL-MCNC: 0.52 MG/DL (ref 0.2–1)
BUN SERPL-MCNC: 15 MG/DL (ref 5–25)
CALCIUM SERPL-MCNC: 9.6 MG/DL (ref 8.3–10.1)
CHLORIDE SERPL-SCNC: 103 MMOL/L (ref 100–108)
CHOLEST SERPL-MCNC: 181 MG/DL (ref 50–200)
CO2 SERPL-SCNC: 30 MMOL/L (ref 21–32)
CREAT SERPL-MCNC: 0.75 MG/DL (ref 0.6–1.3)
GFR SERPL CREATININE-BSD FRML MDRD: 99 ML/MIN/1.73SQ M
GLUCOSE P FAST SERPL-MCNC: 110 MG/DL (ref 65–99)
HDLC SERPL-MCNC: 29 MG/DL
LDLC SERPL CALC-MCNC: 129 MG/DL (ref 0–100)
NONHDLC SERPL-MCNC: 152 MG/DL
POTASSIUM SERPL-SCNC: 4.1 MMOL/L (ref 3.5–5.3)
PROT SERPL-MCNC: 8.6 G/DL (ref 6.4–8.2)
SODIUM SERPL-SCNC: 142 MMOL/L (ref 136–145)
TRIGL SERPL-MCNC: 116 MG/DL

## 2021-06-18 PROCEDURE — 80053 COMPREHEN METABOLIC PANEL: CPT

## 2021-06-18 PROCEDURE — 80061 LIPID PANEL: CPT

## 2021-06-18 PROCEDURE — 36415 COLL VENOUS BLD VENIPUNCTURE: CPT

## 2021-08-20 ENCOUNTER — HOSPITAL ENCOUNTER (EMERGENCY)
Facility: HOSPITAL | Age: 61
Discharge: HOME | End: 2021-08-20
Attending: EMERGENCY MEDICINE
Payer: COMMERCIAL

## 2021-08-20 ENCOUNTER — APPOINTMENT (EMERGENCY)
Dept: RADIOLOGY | Facility: HOSPITAL | Age: 61
End: 2021-08-20
Payer: COMMERCIAL

## 2021-08-20 VITALS
DIASTOLIC BLOOD PRESSURE: 55 MMHG | BODY MASS INDEX: 35.12 KG/M2 | TEMPERATURE: 99.2 F | RESPIRATION RATE: 16 BRPM | OXYGEN SATURATION: 100 % | WEIGHT: 204.59 LBS | SYSTOLIC BLOOD PRESSURE: 104 MMHG | HEART RATE: 86 BPM

## 2021-08-20 DIAGNOSIS — J06.9 URI (UPPER RESPIRATORY INFECTION): ICD-10-CM

## 2021-08-20 DIAGNOSIS — E87.6 HYPOKALEMIA: Primary | ICD-10-CM

## 2021-08-20 LAB
ALBUMIN SERPL BCP-MCNC: 3.9 G/DL (ref 3.5–5)
ALP SERPL-CCNC: 127 U/L (ref 46–116)
ALT SERPL W P-5'-P-CCNC: 57 U/L (ref 12–78)
ANION GAP SERPL CALCULATED.3IONS-SCNC: 10 MMOL/L (ref 4–13)
AST SERPL W P-5'-P-CCNC: 32 U/L (ref 5–45)
ATRIAL RATE: 89 BPM
BASOPHILS # BLD AUTO: 0.04 THOUSANDS/ΜL (ref 0–0.1)
BASOPHILS NFR BLD AUTO: 1 % (ref 0–1)
BILIRUB SERPL-MCNC: 0.38 MG/DL (ref 0.2–1)
BUN SERPL-MCNC: 13 MG/DL (ref 5–25)
CALCIUM SERPL-MCNC: 8.6 MG/DL (ref 8.3–10.1)
CHLORIDE SERPL-SCNC: 98 MMOL/L (ref 100–108)
CO2 SERPL-SCNC: 31 MMOL/L (ref 21–32)
CREAT SERPL-MCNC: 0.93 MG/DL (ref 0.6–1.3)
EOSINOPHIL # BLD AUTO: 0.21 THOUSAND/ΜL (ref 0–0.61)
EOSINOPHIL NFR BLD AUTO: 3 % (ref 0–6)
ERYTHROCYTE [DISTWIDTH] IN BLOOD BY AUTOMATED COUNT: 14.6 % (ref 11.6–15.1)
GFR SERPL CREATININE-BSD FRML MDRD: 76 ML/MIN/1.73SQ M
GLUCOSE SERPL-MCNC: 104 MG/DL (ref 65–140)
HCT VFR BLD AUTO: 37.7 % (ref 34.8–46.1)
HGB BLD-MCNC: 12.1 G/DL (ref 11.5–15.4)
IMM GRANULOCYTES # BLD AUTO: 0.02 THOUSAND/UL (ref 0–0.2)
IMM GRANULOCYTES NFR BLD AUTO: 0 % (ref 0–2)
LIPASE SERPL-CCNC: 57 U/L (ref 73–393)
LYMPHOCYTES # BLD AUTO: 1.57 THOUSANDS/ΜL (ref 0.6–4.47)
LYMPHOCYTES NFR BLD AUTO: 25 % (ref 14–44)
MCH RBC QN AUTO: 27.8 PG (ref 26.8–34.3)
MCHC RBC AUTO-ENTMCNC: 32.1 G/DL (ref 31.4–37.4)
MCV RBC AUTO: 87 FL (ref 82–98)
MONOCYTES # BLD AUTO: 0.61 THOUSAND/ΜL (ref 0.17–1.22)
MONOCYTES NFR BLD AUTO: 10 % (ref 4–12)
NEUTROPHILS # BLD AUTO: 3.79 THOUSANDS/ΜL (ref 1.85–7.62)
NEUTS SEG NFR BLD AUTO: 61 % (ref 43–75)
NRBC BLD AUTO-RTO: 0 /100 WBCS
P AXIS: 54 DEGREES
PLATELET # BLD AUTO: 199 THOUSANDS/UL (ref 149–390)
PMV BLD AUTO: 11.5 FL (ref 8.9–12.7)
POTASSIUM SERPL-SCNC: 3.2 MMOL/L (ref 3.5–5.3)
PR INTERVAL: 160 MS
PROT SERPL-MCNC: 7.9 G/DL (ref 6.4–8.2)
QRS AXIS: 15 DEGREES
QRSD INTERVAL: 76 MS
QT INTERVAL: 330 MS
QTC INTERVAL: 401 MS
RBC # BLD AUTO: 4.35 MILLION/UL (ref 3.81–5.12)
SARS-COV-2 RNA RESP QL NAA+PROBE: NEGATIVE
SODIUM SERPL-SCNC: 139 MMOL/L (ref 136–145)
T WAVE AXIS: -9 DEGREES
TROPONIN I SERPL-MCNC: <0.02 NG/ML
VENTRICULAR RATE: 89 BPM
WBC # BLD AUTO: 6.24 THOUSAND/UL (ref 4.31–10.16)

## 2021-08-20 PROCEDURE — 80053 COMPREHEN METABOLIC PANEL: CPT | Performed by: EMERGENCY MEDICINE

## 2021-08-20 PROCEDURE — 83690 ASSAY OF LIPASE: CPT | Performed by: EMERGENCY MEDICINE

## 2021-08-20 PROCEDURE — 93005 ELECTROCARDIOGRAM TRACING: CPT

## 2021-08-20 PROCEDURE — 96374 THER/PROPH/DIAG INJ IV PUSH: CPT

## 2021-08-20 PROCEDURE — 99284 EMERGENCY DEPT VISIT MOD MDM: CPT

## 2021-08-20 PROCEDURE — U0005 INFEC AGEN DETEC AMPLI PROBE: HCPCS | Performed by: EMERGENCY MEDICINE

## 2021-08-20 PROCEDURE — 99285 EMERGENCY DEPT VISIT HI MDM: CPT | Performed by: EMERGENCY MEDICINE

## 2021-08-20 PROCEDURE — U0003 INFECTIOUS AGENT DETECTION BY NUCLEIC ACID (DNA OR RNA); SEVERE ACUTE RESPIRATORY SYNDROME CORONAVIRUS 2 (SARS-COV-2) (CORONAVIRUS DISEASE [COVID-19]), AMPLIFIED PROBE TECHNIQUE, MAKING USE OF HIGH THROUGHPUT TECHNOLOGIES AS DESCRIBED BY CMS-2020-01-R: HCPCS | Performed by: EMERGENCY MEDICINE

## 2021-08-20 PROCEDURE — 36415 COLL VENOUS BLD VENIPUNCTURE: CPT | Performed by: EMERGENCY MEDICINE

## 2021-08-20 PROCEDURE — 85025 COMPLETE CBC W/AUTO DIFF WBC: CPT | Performed by: EMERGENCY MEDICINE

## 2021-08-20 PROCEDURE — 71045 X-RAY EXAM CHEST 1 VIEW: CPT

## 2021-08-20 PROCEDURE — 84484 ASSAY OF TROPONIN QUANT: CPT | Performed by: EMERGENCY MEDICINE

## 2021-08-20 PROCEDURE — 93010 ELECTROCARDIOGRAM REPORT: CPT | Performed by: INTERNAL MEDICINE

## 2021-08-20 RX ORDER — BENZONATATE 100 MG/1
100 CAPSULE ORAL ONCE
Status: COMPLETED | OUTPATIENT
Start: 2021-08-20 | End: 2021-08-20

## 2021-08-20 RX ORDER — POTASSIUM CHLORIDE 20 MEQ/1
40 TABLET, EXTENDED RELEASE ORAL ONCE
Status: COMPLETED | OUTPATIENT
Start: 2021-08-20 | End: 2021-08-20

## 2021-08-20 RX ORDER — GUAIFENESIN 600 MG
600 TABLET, EXTENDED RELEASE 12 HR ORAL ONCE
Status: COMPLETED | OUTPATIENT
Start: 2021-08-20 | End: 2021-08-20

## 2021-08-20 RX ORDER — ACETAMINOPHEN 325 MG/1
975 TABLET ORAL ONCE
Status: COMPLETED | OUTPATIENT
Start: 2021-08-20 | End: 2021-08-20

## 2021-08-20 RX ORDER — KETOROLAC TROMETHAMINE 30 MG/ML
15 INJECTION, SOLUTION INTRAMUSCULAR; INTRAVENOUS ONCE
Status: COMPLETED | OUTPATIENT
Start: 2021-08-20 | End: 2021-08-20

## 2021-08-20 RX ADMIN — ACETAMINOPHEN 975 MG: 325 TABLET, FILM COATED ORAL at 15:09

## 2021-08-20 RX ADMIN — KETOROLAC TROMETHAMINE 15 MG: 30 INJECTION, SOLUTION INTRAMUSCULAR; INTRAVENOUS at 15:08

## 2021-08-20 RX ADMIN — POTASSIUM CHLORIDE 40 MEQ: 1500 TABLET, EXTENDED RELEASE ORAL at 16:03

## 2021-08-20 RX ADMIN — GUAIFENESIN 600 MG: 600 TABLET ORAL at 15:12

## 2021-08-20 RX ADMIN — BENZONATATE 100 MG: 100 CAPSULE ORAL at 15:12

## 2021-08-20 NOTE — ED PROVIDER NOTES
History  Chief Complaint   Patient presents with    Medical Problem     Pt reports cough,headache,SOB,nasal congestion, generalized body aches, chills, fever for the  past 3 days  58 yo F h/o asthma, NIDDM, HTN, HLD presenting for evaluation of 3 days of cough, body aches, congestion, HA  Sx constant  Cough but not SOB  No known fevers  3year old family member was sick with similar, unknown diagnosis  C/o epigastric/lower chest discomfort  Denies N/V/D/C, urinary complaints  COVID vaccines  &     MDM: 58 yo F with cough/URI sx- symptomatic tx, labs to r/o metabolic derangement, CXR             Prior to Admission Medications   Prescriptions Last Dose Informant Patient Reported? Taking? Linaclotide (LINZESS) 67 MCG CAPS   Yes No   Sig: Take by mouth   atenolol-chlorthalidone (TENORETIC) 100-25 mg per tablet   No No   Sig: Take 1 tablet by mouth daily for 14 days   atorvastatin (LIPITOR) 10 mg tablet   Yes No   Sig: Take 10 mg by mouth   calcium polycarbophil (FIBERCON) 625 mg tablet   Yes No   Sig: Take 625 mg by mouth   fluticasone (FLONASE) 50 mcg/act nasal spray   Yes No   Sig: instill 2 sprays into each nostril once daily   lisinopril (ZESTRIL) 5 mg tablet   Yes No   Sig: Take 5 mg by mouth   metFORMIN (GLUCOPHAGE) 500 mg tablet   Yes No   Sig: Take 500 mg by mouth 2 (two) times a day   naproxen (NAPROSYN) 500 mg tablet   No No   Sig: Take 1 tablet (500 mg total) by mouth 2 (two) times a day with meals for 5 days   omeprazole (PRILOSEC) 20 mg delayed release capsule   Yes No   Sig: Take 20 mg by mouth      Facility-Administered Medications: None       Past Medical History:   Diagnosis Date    Asthma     Diabetes mellitus [address] [address] )     Hyperlipidemia     Hypertension        Past Surgical History:   Procedure Laterality Date     SECTION      HYSTERECTOMY         History reviewed  No pertinent family history  I have reviewed and agree with the history as documented      E-Cigarette/Vaping E-Cigarette/Vaping Substances     Social History     Tobacco Use    Smoking status: Never Smoker    Smokeless tobacco: Never Used   Substance Use Topics    Alcohol use: No    Drug use: No       Review of Systems   Constitutional: Positive for fatigue  Negative for chills, fever and unexpected weight change  HENT: Positive for congestion  Negative for ear pain, rhinorrhea and sore throat  Eyes: Negative for pain and visual disturbance  Respiratory: Positive for cough  Negative for shortness of breath  Cardiovascular: Negative for chest pain and leg swelling  Gastrointestinal: Negative for abdominal pain, constipation, diarrhea, nausea and vomiting  Endocrine: Negative for polydipsia, polyphagia and polyuria  Genitourinary: Negative for dysuria, frequency, hematuria and urgency  Musculoskeletal: Positive for myalgias  Negative for back pain and neck pain  Skin: Negative for color change and rash  Allergic/Immunologic: Negative for environmental allergies and immunocompromised state  Neurological: Positive for headaches  Negative for dizziness, weakness, light-headedness and numbness  Hematological: Negative for adenopathy  Does not bruise/bleed easily  Psychiatric/Behavioral: Negative for agitation and confusion  All other systems reviewed and are negative  Physical Exam  Physical Exam  Vitals and nursing note reviewed  Constitutional:       General: She is not in acute distress  Appearance: Normal appearance  She is well-developed  HENT:      Head: Normocephalic and atraumatic  Nose: Nose normal    Eyes:      Conjunctiva/sclera: Conjunctivae normal    Cardiovascular:      Rate and Rhythm: Normal rate and regular rhythm  Heart sounds: Normal heart sounds  Pulmonary:      Effort: No respiratory distress  Breath sounds: Normal breath sounds  No stridor  No wheezing or rales        Comments: Cough present, lungs clear without wheezing, no increased work of breathing  Chest:      Chest wall: No tenderness  Abdominal:      General: There is no distension  Palpations: Abdomen is soft  Tenderness: There is no abdominal tenderness  There is no guarding or rebound  Musculoskeletal:         General: No swelling, tenderness or deformity  Cervical back: Normal range of motion and neck supple  Skin:     General: Skin is warm and dry  Findings: No rash  Neurological:      Mental Status: She is alert and oriented to person, place, and time  Motor: No abnormal muscle tone  Coordination: Coordination normal    Psychiatric:         Thought Content:  Thought content normal          Judgment: Judgment normal          Vital Signs  ED Triage Vitals   Temperature Pulse Respirations Blood Pressure SpO2   08/20/21 1354 08/20/21 1354 08/20/21 1354 08/20/21 1354 08/20/21 1354   99 2 °F (37 3 °C) 81 18 139/62 98 %      Temp Source Heart Rate Source Patient Position - Orthostatic VS BP Location FiO2 (%)   08/20/21 1354 08/20/21 1354 08/20/21 1354 08/20/21 1354 --   Oral Monitor Sitting Right arm       Pain Score       08/20/21 1602       8           Vitals:    08/20/21 1354 08/20/21 1602   BP: 139/62 104/55   Pulse: 81 86   Patient Position - Orthostatic VS: Sitting          Visual Acuity      ED Medications  Medications   benzonatate (TESSALON PERLES) capsule 100 mg (100 mg Oral Given 8/20/21 1512)   ketorolac (TORADOL) injection 15 mg (15 mg Intravenous Given 8/20/21 1508)   acetaminophen (TYLENOL) tablet 975 mg (975 mg Oral Given 8/20/21 1509)   guaiFENesin (MUCINEX) 12 hr tablet 600 mg (600 mg Oral Given 8/20/21 1512)   potassium chloride (K-DUR,KLOR-CON) CR tablet 40 mEq (40 mEq Oral Given 8/20/21 1603)       Diagnostic Studies  Results Reviewed     Procedure Component Value Units Date/Time    Novel Coronavirus (Covid-19),PCR [facility] - 2 Hour Stat [[id number]]  (Normal) Collected: 08/20/21 1500    Lab Status: Final result Specimen: Nares from Nasopharyngeal Swab Updated: 08/20/21 1620     SARS-CoV-2 Negative    Narrative: The specimen collection materials, transport medium, and/or testing methodology utilized in the production of these test results have been proven to be reliable in a limited validation with an abbreviated program under the Emergency Utilization Authorization provided by the FDA  Testing reported as "Presumptive positive" will be confirmed with secondary testing to ensure result accuracy  Clinical caution and judgement should be used with the interpretation of these results with consideration of the clinical impression and other laboratory testing  Testing reported as "Positive" or "Negative" has been proven to be accurate according to standard laboratory validation requirements  All testing is performed with control materials showing appropriate reactivity at standard intervals        Comprehensive metabolic panel [[id number]]  (Abnormal) Collected: 08/20/21 1505    Lab Status: Final result Specimen: Blood from Arm, Left Updated: 08/20/21 1544     Sodium 139 mmol/L      Potassium 3 2 mmol/L      Chloride 98 mmol/L      CO2 31 mmol/L      ANION GAP 10 mmol/L      BUN 13 mg/dL      Creatinine 0 93 mg/dL      Glucose 104 mg/dL      Calcium 8 6 mg/dL      AST 32 U/L      ALT 57 U/L      Alkaline Phosphatase 127 U/L      Total Protein 7 9 g/dL      Albumin 3 9 g/dL      Total Bilirubin 0 38 mg/dL      eGFR 76 ml/min/1 73sq m     Narrative:      [place] guidelines for Chronic Kidney Disease (CKD):     Stage 1 with normal or high GFR (GFR > 90 mL/min/1 73 square meters)    Stage 2 Mild CKD (GFR = 60-89 mL/min/1 73 square meters)    Stage 3A Moderate CKD (GFR = 45-59 mL/min/1 73 square meters)    Stage 3B Moderate CKD (GFR = 30-44 mL/min/1 73 square meters)    Stage 4 Severe CKD (GFR = 15-29 mL/min/1 73 square meters)    Stage 5 End Stage CKD (GFR <15 mL/min/1 73 square meters)  Note: GFR calculation is accurate only with a steady state creatinine    Lipase [[id number]]  (Abnormal) Collected: 08/20/21 1505    Lab Status: Final result Specimen: Blood from Arm, Left Updated: 08/20/21 1544     Lipase 57 u/L     Troponin I [[id number]]  (Normal) Collected: 08/20/21 1505    Lab Status: Final result Specimen: Blood from Arm, Left Updated: 08/20/21 1528     Troponin I <0 02 ng/mL     CBC and differential [[id number]] Collected: 08/20/21 1505    Lab Status: Final result Specimen: Blood from Arm, Left Updated: 08/20/21 1511     WBC 6 24 Thousand/uL      RBC 4 35 Million/uL      Hemoglobin 12 1 g/dL      Hematocrit 37 7 %      MCV 87 fL      MCH 27 8 pg      MCHC 32 1 g/dL      RDW 14 6 %      MPV 11 5 fL      Platelets 440 Thousands/uL      nRBC 0 /100 WBCs      Neutrophils Relative 61 %      Immat GRANS % 0 %      Lymphocytes Relative 25 %      Monocytes Relative 10 %      Eosinophils Relative 3 %      Basophils Relative 1 %      Neutrophils Absolute 3 79 Thousands/µL      Immature Grans Absolute 0 02 Thousand/uL      Lymphocytes Absolute 1 57 Thousands/µL      Monocytes Absolute 0 61 Thousand/µL      Eosinophils Absolute 0 21 Thousand/µL      Basophils Absolute 0 04 Thousands/µL                  XR chest 1 view portable   ED Interpretation by Qi Crockett DO (08/20 1625)   FINDINGS:     Cardiomediastinal silhouette appears unremarkable      The lungs are clear  No pneumothorax or pleural effusion      Osseous structures appear within normal limits for patient age      IMPRESSION:     No acute cardiopulmonary disease  Final Result by Vicki Lopez DO (08/20 1622)      No acute cardiopulmonary disease                    Workstation performed: [id number]                    Procedures  Procedures         ED Course  ED Course as of Aug 20 1914   Fri Aug 20, 2021   1519 EKG: NSR @ 89 bpm, LAD, normal intervals, nonspecific changes      1554 Potassium(!): 3 2   1601 Pending cxr for discharge SBIRT 20yo+      Most Recent Value   SBIRT (24 yo +)   In order to provide better care to our patients, we are screening all of our patients for alcohol and drug use  Would it be okay to ask you these screening questions? No Filed at: 08/20/2021 1545                    [facility]  Number of Diagnoses or Management Options  Hypokalemia  URI (upper respiratory infection)  Diagnosis management comments: 56 yo F with cough/myalgias/HA, likely viral illness  ED workup with hypokalemia, otherwise labs/CXR unremarkable  COVID pending    Discussed symptomatic tx at home, PCP f/u and return precautions       Amount and/or Complexity of Data Reviewed  Clinical lab tests: ordered and reviewed  Tests in the radiology section of CPT®: ordered and reviewed  Tests in the medicine section of CPT®: ordered and reviewed  Review and summarize past medical records: yes  Independent visualization of images, tracings, or specimens: yes        Disposition  Final diagnoses:   Hypokalemia   URI (upper respiratory infection)     Time reflects when diagnosis was documented in both MDM as applicable and the Disposition within this note     Time User Action Codes Description Comment    8/20/2021  3:54 PM Mattehw HALE Add [E87 6] Hypokalemia     8/20/2021  3:54 PM Mtathew HALE Add [J06 9] URI (upper respiratory infection)       ED Disposition     ED Disposition Condition Date/Time Comment    Discharge Stable Fri Aug 20, 2021  4:15 PM Dhaval Pretty discharge to home/self care              Follow-up Information     Follow up With Specialties Details Why Contact Info    Jennifer Garza MD Internal Medicine   [facility] [address]  [phone]            Discharge Medication List as of 8/20/2021  4:18 PM      CONTINUE these medications which have NOT CHANGED    Details   atenolol-chlorthalidone (TENORETIC) 100-25 mg per tablet Take 1 tablet by mouth daily for 14 days, Starting Mon 6/12/2017, Until Mon 6/26/2017, Print      atorvastatin (LIPITOR) 10 mg tablet Take 10 mg by mouth, Starting Wed 4/25/2018, Until Thu 4/25/2019, Historical Med      calcium polycarbophil (FIBERCON) 625 mg tablet Take 625 mg by mouth, Starting Wed 4/25/2018, Until Thu 4/25/2019, Historical Med      fluticasone (FLONASE) 50 mcg/act nasal spray instill 2 sprays into each nostril once daily, Historical Med      Linaclotide (LINZESS) 72 MCG CAPS Take by mouth, Historical Med      lisinopril (ZESTRIL) 5 mg tablet Take 5 mg by mouth, Starting Wed 4/25/2018, Until Thu 4/25/2019, Historical Med      metFORMIN (GLUCOPHAGE) 500 mg tablet Take 500 mg by mouth 2 (two) times a day, Historical Med      naproxen (NAPROSYN) 500 mg tablet Take 1 tablet (500 mg total) by mouth 2 (two) times a day with meals for 5 days, Starting Sun 9/23/2018, Until Fri 9/28/2018, Print      omeprazole (PRILOSEC) 20 mg delayed release capsule Take 20 mg by mouth, Starting Wed 4/25/2018, Until Thu 4/25/2019, Historical Med           No discharge procedures on file      PDMP Review     None          ED Provider  Electronically Signed by           Noah Camarillo DO  08/20/21 1914

## 2021-08-20 NOTE — DISCHARGE INSTRUCTIONS
Tylenol and/or Ibuprofen as needed for fevers/body aches  Stay well hydrated  Honey/cough medications as needed for cough  Asthma as needed for wheezing/difficulty breathing    COVID test pending  Isolate until this results     Follow up with family doctor    Return to ER if you develop any new or worsening symptoms including but not limited to difficulty breathing, lightheadedness/passing out, etc

## 2022-10-17 ENCOUNTER — HOSPITAL ENCOUNTER (INPATIENT)
Facility: HOSPITAL | Age: 62
LOS: 1 days | Discharge: HOME | DRG: 811 | End: 2022-10-18
Attending: EMERGENCY MEDICINE | Admitting: INTERNAL MEDICINE
Payer: COMMERCIAL

## 2022-10-17 ENCOUNTER — APPOINTMENT (EMERGENCY)
Dept: CT IMAGING | Facility: HOSPITAL | Age: 62
DRG: 811 | End: 2022-10-17
Payer: COMMERCIAL

## 2022-10-17 DIAGNOSIS — K12.2 UVULITIS: ICD-10-CM

## 2022-10-17 DIAGNOSIS — T78.3XXA ANGIOEDEMA, INITIAL ENCOUNTER: ICD-10-CM

## 2022-10-17 DIAGNOSIS — K13.79 SOFT PALATE EDEMA: Primary | ICD-10-CM

## 2022-10-17 PROBLEM — E11.9 TYPE 2 DIABETES MELLITUS (HCC): Status: ACTIVE | Noted: 2022-10-17

## 2022-10-17 PROBLEM — I10 PRIMARY HYPERTENSION: Status: ACTIVE | Noted: 2022-10-17

## 2022-10-17 LAB
ABO GROUP BLD: NORMAL
ABO GROUP BLD: NORMAL
ALBUMIN SERPL BCP-MCNC: 4 G/DL (ref 3.5–5)
ALP SERPL-CCNC: 110 U/L (ref 46–116)
ALT SERPL W P-5'-P-CCNC: 27 U/L (ref 12–78)
ANION GAP SERPL CALCULATED.3IONS-SCNC: 7 MMOL/L (ref 4–13)
AST SERPL W P-5'-P-CCNC: 17 U/L (ref 5–45)
ATRIAL RATE: 67 BPM
BASOPHILS # BLD MANUAL: 0 THOUSAND/UL (ref 0–0.1)
BASOPHILS NFR MAR MANUAL: 0 % (ref 0–1)
BILIRUB SERPL-MCNC: 0.36 MG/DL (ref 0.2–1)
BLD GP AB SCN SERPL QL: NEGATIVE
BUN SERPL-MCNC: 17 MG/DL (ref 5–25)
CALCIUM SERPL-MCNC: 9.6 MG/DL (ref 8.3–10.1)
CHLORIDE SERPL-SCNC: 100 MMOL/L (ref 96–108)
CO2 SERPL-SCNC: 35 MMOL/L (ref 21–32)
CREAT SERPL-MCNC: 0.69 MG/DL (ref 0.6–1.3)
EOSINOPHIL # BLD MANUAL: 0 THOUSAND/UL (ref 0–0.4)
EOSINOPHIL NFR BLD MANUAL: 0 % (ref 0–6)
ERYTHROCYTE [DISTWIDTH] IN BLOOD BY AUTOMATED COUNT: 15.3 % (ref 11.6–15.1)
GFR SERPL CREATININE-BSD FRML MDRD: 92 ML/MIN/1.73SQ M
GLUCOSE SERPL-MCNC: 85 MG/DL (ref 65–140)
HCT VFR BLD AUTO: 39 % (ref 34.8–46.1)
HGB BLD-MCNC: 12.3 G/DL (ref 11.5–15.4)
LYMPHOCYTES # BLD AUTO: 4.83 THOUSAND/UL (ref 0.6–4.47)
LYMPHOCYTES # BLD AUTO: 51 % (ref 14–44)
MCH RBC QN AUTO: 27.3 PG (ref 26.8–34.3)
MCHC RBC AUTO-ENTMCNC: 31.5 G/DL (ref 31.4–37.4)
MCV RBC AUTO: 87 FL (ref 82–98)
MONOCYTES # BLD AUTO: 0.28 THOUSAND/UL (ref 0–1.22)
MONOCYTES NFR BLD: 3 % (ref 4–12)
NEUTROPHILS # BLD MANUAL: 4.36 THOUSAND/UL (ref 1.85–7.62)
NEUTS SEG NFR BLD AUTO: 46 % (ref 43–75)
P AXIS: 50 DEGREES
PLATELET # BLD AUTO: 238 THOUSANDS/UL (ref 149–390)
PLATELET # BLD AUTO: 264 THOUSANDS/UL (ref 149–390)
PLATELET BLD QL SMEAR: ADEQUATE
PMV BLD AUTO: 11.8 FL (ref 8.9–12.7)
PMV BLD AUTO: 12.1 FL (ref 8.9–12.7)
POTASSIUM SERPL-SCNC: 3.7 MMOL/L (ref 3.5–5.3)
PR INTERVAL: 158 MS
PROT SERPL-MCNC: 8.6 G/DL (ref 6.4–8.4)
QRS AXIS: 9 DEGREES
QRSD INTERVAL: 70 MS
QT INTERVAL: 398 MS
QTC INTERVAL: 420 MS
RBC # BLD AUTO: 4.51 MILLION/UL (ref 3.81–5.12)
RBC MORPH BLD: NORMAL
RH BLD: POSITIVE
RH BLD: POSITIVE
SODIUM SERPL-SCNC: 142 MMOL/L (ref 135–147)
SPECIMEN EXPIRATION DATE: NORMAL
T WAVE AXIS: -6 DEGREES
VENTRICULAR RATE: 67 BPM
WBC # BLD AUTO: 9.47 THOUSAND/UL (ref 4.31–10.16)

## 2022-10-17 PROCEDURE — 93005 ELECTROCARDIOGRAM TRACING: CPT

## 2022-10-17 PROCEDURE — 85025 COMPLETE CBC W/AUTO DIFF WBC: CPT

## 2022-10-17 PROCEDURE — 85049 AUTOMATED PLATELET COUNT: CPT | Performed by: NURSE PRACTITIONER

## 2022-10-17 PROCEDURE — 85007 BL SMEAR W/DIFF WBC COUNT: CPT

## 2022-10-17 PROCEDURE — 30233K1 TRANSFUSION OF NONAUTOLOGOUS FROZEN PLASMA INTO PERIPHERAL VEIN, PERCUTANEOUS APPROACH: ICD-10-PCS | Performed by: EMERGENCY MEDICINE

## 2022-10-17 PROCEDURE — 96365 THER/PROPH/DIAG IV INF INIT: CPT

## 2022-10-17 PROCEDURE — 85027 COMPLETE CBC AUTOMATED: CPT

## 2022-10-17 PROCEDURE — 80053 COMPREHEN METABOLIC PANEL: CPT

## 2022-10-17 PROCEDURE — 99285 EMERGENCY DEPT VISIT HI MDM: CPT

## 2022-10-17 PROCEDURE — 93010 ELECTROCARDIOGRAM REPORT: CPT

## 2022-10-17 PROCEDURE — 86900 BLOOD TYPING SEROLOGIC ABO: CPT

## 2022-10-17 PROCEDURE — 96372 THER/PROPH/DIAG INJ SC/IM: CPT

## 2022-10-17 PROCEDURE — 86901 BLOOD TYPING SEROLOGIC RH(D): CPT

## 2022-10-17 PROCEDURE — 70491 CT SOFT TISSUE NECK W/DYE: CPT

## 2022-10-17 PROCEDURE — 86850 RBC ANTIBODY SCREEN: CPT

## 2022-10-17 PROCEDURE — 96375 TX/PRO/DX INJ NEW DRUG ADDON: CPT

## 2022-10-17 PROCEDURE — P9017 PLASMA 1 DONOR FRZ W/IN 8 HR: HCPCS

## 2022-10-17 PROCEDURE — 36415 COLL VENOUS BLD VENIPUNCTURE: CPT

## 2022-10-17 PROCEDURE — 99291 CRITICAL CARE FIRST HOUR: CPT | Performed by: NURSE PRACTITIONER

## 2022-10-17 RX ORDER — METHYLPREDNISOLONE SODIUM SUCCINATE 125 MG/2ML
125 INJECTION, POWDER, LYOPHILIZED, FOR SOLUTION INTRAMUSCULAR; INTRAVENOUS ONCE
Status: COMPLETED | OUTPATIENT
Start: 2022-10-17 | End: 2022-10-17

## 2022-10-17 RX ORDER — EPINEPHRINE 0.1 MG/ML
0.3 SYRINGE (ML) INJECTION ONCE
Status: DISCONTINUED | OUTPATIENT
Start: 2022-10-17 | End: 2022-10-17

## 2022-10-17 RX ORDER — FLUTICASONE PROPIONATE 50 MCG
2 SPRAY, SUSPENSION (ML) NASAL DAILY
Status: DISCONTINUED | OUTPATIENT
Start: 2022-10-18 | End: 2022-10-18 | Stop reason: HOSPADM

## 2022-10-17 RX ORDER — CELECOXIB 50 MG/1
1 CAPSULE ORAL 2 TIMES DAILY
COMMUNITY
Start: 2022-08-18 | End: 2022-10-18

## 2022-10-17 RX ORDER — CHLORTHALIDONE 25 MG/1
25 TABLET ORAL DAILY
Status: DISCONTINUED | OUTPATIENT
Start: 2022-10-18 | End: 2022-10-18 | Stop reason: HOSPADM

## 2022-10-17 RX ORDER — DULOXETIN HYDROCHLORIDE 30 MG/1
1 CAPSULE, DELAYED RELEASE ORAL DAILY
COMMUNITY
Start: 2022-07-19

## 2022-10-17 RX ORDER — AMOXICILLIN 250 MG
1 CAPSULE ORAL DAILY
COMMUNITY
Start: 2022-03-07 | End: 2023-03-07

## 2022-10-17 RX ORDER — HEPARIN SODIUM 5000 [USP'U]/ML
5000 INJECTION, SOLUTION INTRAVENOUS; SUBCUTANEOUS EVERY 8 HOURS SCHEDULED
Status: DISCONTINUED | OUTPATIENT
Start: 2022-10-18 | End: 2022-10-18 | Stop reason: HOSPADM

## 2022-10-17 RX ORDER — DIPHENHYDRAMINE HYDROCHLORIDE 50 MG/ML
50 INJECTION INTRAMUSCULAR; INTRAVENOUS ONCE
Status: COMPLETED | OUTPATIENT
Start: 2022-10-17 | End: 2022-10-17

## 2022-10-17 RX ORDER — EPINEPHRINE 1 MG/ML
0.3 INJECTION, SOLUTION, CONCENTRATE INTRAVENOUS ONCE
Status: COMPLETED | OUTPATIENT
Start: 2022-10-17 | End: 2022-10-17

## 2022-10-17 RX ORDER — FAMOTIDINE 10 MG/ML
20 INJECTION, SOLUTION INTRAVENOUS ONCE
Status: COMPLETED | OUTPATIENT
Start: 2022-10-17 | End: 2022-10-17

## 2022-10-17 RX ORDER — SODIUM CHLORIDE, SODIUM LACTATE, POTASSIUM CHLORIDE, CALCIUM CHLORIDE 600; 310; 30; 20 MG/100ML; MG/100ML; MG/100ML; MG/100ML
75 INJECTION, SOLUTION INTRAVENOUS CONTINUOUS
Status: DISCONTINUED | OUTPATIENT
Start: 2022-10-17 | End: 2022-10-18

## 2022-10-17 RX ORDER — DIPHENHYDRAMINE HYDROCHLORIDE 50 MG/ML
25 INJECTION INTRAMUSCULAR; INTRAVENOUS EVERY 6 HOURS PRN
Status: DISCONTINUED | OUTPATIENT
Start: 2022-10-17 | End: 2022-10-18 | Stop reason: HOSPADM

## 2022-10-17 RX ORDER — ATORVASTATIN CALCIUM 20 MG/1
10 TABLET, FILM COATED ORAL
Status: DISCONTINUED | OUTPATIENT
Start: 2022-10-18 | End: 2022-10-18 | Stop reason: HOSPADM

## 2022-10-17 RX ORDER — FAMOTIDINE 10 MG/ML
20 INJECTION, SOLUTION INTRAVENOUS EVERY 12 HOURS SCHEDULED
Status: DISCONTINUED | OUTPATIENT
Start: 2022-10-18 | End: 2022-10-18 | Stop reason: HOSPADM

## 2022-10-17 RX ORDER — ALBUTEROL SULFATE 90 UG/1
2 AEROSOL, METERED RESPIRATORY (INHALATION) EVERY 6 HOURS PRN
COMMUNITY
Start: 2022-09-06

## 2022-10-17 RX ORDER — PYRIDOXINE HCL (VITAMIN B6) 50 MG
50 TABLET ORAL DAILY
COMMUNITY
Start: 2022-10-10 | End: 2023-01-08

## 2022-10-17 RX ORDER — ATENOLOL 50 MG/1
50 TABLET ORAL DAILY
Status: DISCONTINUED | OUTPATIENT
Start: 2022-10-18 | End: 2022-10-18 | Stop reason: HOSPADM

## 2022-10-17 RX ORDER — METHYLPREDNISOLONE SODIUM SUCCINATE 40 MG/ML
40 INJECTION, POWDER, LYOPHILIZED, FOR SOLUTION INTRAMUSCULAR; INTRAVENOUS EVERY 8 HOURS SCHEDULED
Status: DISCONTINUED | OUTPATIENT
Start: 2022-10-18 | End: 2022-10-18 | Stop reason: HOSPADM

## 2022-10-17 RX ORDER — CLINDAMYCIN PHOSPHATE 600 MG/50ML
600 INJECTION INTRAVENOUS ONCE
Status: COMPLETED | OUTPATIENT
Start: 2022-10-17 | End: 2022-10-18

## 2022-10-17 RX ORDER — ISONIAZID 300 MG/1
900 TABLET ORAL WEEKLY
COMMUNITY
Start: 2022-10-10 | End: 2022-12-27

## 2022-10-17 RX ORDER — RIFAPENTINE 150 MG/1
900 TABLET, FILM COATED ORAL
COMMUNITY
Start: 2022-10-10 | End: 2022-12-27

## 2022-10-17 RX ADMIN — DIPHENHYDRAMINE HYDROCHLORIDE 50 MG: 50 INJECTION, SOLUTION INTRAMUSCULAR; INTRAVENOUS at 19:36

## 2022-10-17 RX ADMIN — EPINEPHRINE 0.3 MG: 1 INJECTION, SOLUTION, CONCENTRATE INTRAVENOUS at 19:45

## 2022-10-17 RX ADMIN — TRANEXAMIC ACID 1000 MG: 1 INJECTION, SOLUTION INTRAVENOUS at 19:49

## 2022-10-17 RX ADMIN — METHYLPREDNISOLONE SODIUM SUCCINATE 125 MG: 125 INJECTION, POWDER, FOR SOLUTION INTRAMUSCULAR; INTRAVENOUS at 19:42

## 2022-10-17 RX ADMIN — FAMOTIDINE 20 MG: 10 INJECTION INTRAVENOUS at 19:38

## 2022-10-17 RX ADMIN — IOHEXOL 85 ML: 350 INJECTION, SOLUTION INTRAVENOUS at 20:32

## 2022-10-17 RX ADMIN — CLINDAMYCIN IN 5 PERCENT DEXTROSE 600 MG: 12 INJECTION, SOLUTION INTRAVENOUS at 22:01

## 2022-10-17 RX ADMIN — SODIUM CHLORIDE, SODIUM LACTATE, POTASSIUM CHLORIDE, AND CALCIUM CHLORIDE 75 ML/HR: .6; .31; .03; .02 INJECTION, SOLUTION INTRAVENOUS at 22:40

## 2022-10-17 NOTE — ED PROVIDER NOTES
History  Chief Complaint   Patient presents with   • Sore Throat     Patient complaining of sore throat, hurts to swallow, states just sitting at home and felt like she couldn't swallow, patient states 'feels like throat is getting tighter and tighter"  The patient is a 77-year-old female with history of hypertension, hyperlipidemia, diabetes mellitus, asthma who was on lisinopril, who presents to the ED for evaluation of difficulty swallowing, sensation of tongue and throat swelling  She reports that this began at 2:00 p m  Today, and has slowly worsened since  Initially, she felt that she was developing a sore throat, however reports that this does not feel similar and is more so the sensation of swelling  She does report increased drooling due to difficulty swelling  She otherwise denies lip swelling, fever, chills, rash, cough, congestion, rhinorrhea, headache, chest pain, dyspnea, nausea, vomiting, abdominal pain, leg swelling, diarrhea, constipation  She denies history of similar condition  Denies new medications, new foods, change in lisinopril dosing  Prior to Admission Medications   Prescriptions Last Dose Informant Patient Reported? Taking?    DULoxetine (CYMBALTA) 30 mg delayed release capsule   Yes Yes   Sig: Take 1 capsule by mouth daily   Rifapentine (Priftin) 150 MG TABS   Yes Yes   Sig: Take 900 mg by mouth   albuterol (PROVENTIL HFA,VENTOLIN HFA) 90 mcg/act inhaler   Yes Yes   Sig: Inhale 2 puffs every 6 (six) hours as needed   atenolol-chlorthalidone (TENORETIC) 100-25 mg per tablet   No Yes   Sig: Take 1 tablet by mouth daily for 14 days   atorvastatin (LIPITOR) 10 mg tablet   Yes Yes   Sig: Take 10 mg by mouth   calcium polycarbophil (FIBERCON) 625 mg tablet   Yes Yes   Sig: Take 625 mg by mouth   celecoxib (CeleBREX) 50 MG capsule   Yes Yes   Sig: Take 1 capsule by mouth 2 (two) times a day   fluticasone (FLONASE) 50 mcg/act nasal spray   Yes Yes   Sig: instill 2 sprays into each nostril once daily   isoniazid (NYDRAZID) 300 mg tablet   Yes Yes   Sig: Take 900 mg by mouth once a week   linaCLOtide 72 MCG CAPS Not Taking at Unknown time  Yes No   Sig: Take by mouth   Patient not taking: Reported on 10/17/2022   lisinopril (ZESTRIL) 5 mg tablet   Yes Yes   Sig: Take 5 mg by mouth   metFORMIN (GLUCOPHAGE) 500 mg tablet   Yes Yes   Sig: Take 500 mg by mouth 2 (two) times a day   naproxen (NAPROSYN) 500 mg tablet   No No   Sig: Take 1 tablet (500 mg total) by mouth 2 (two) times a day with meals for 5 days   omeprazole (PriLOSEC) 20 mg delayed release capsule   Yes Yes   Sig: Take 20 mg by mouth   pyridoxine (VITAMIN B6) 50 mg tablet   Yes Yes   Sig: Take 50 mg by mouth daily   senna-docusate sodium (SENOKOT S) 8 6-50 mg per tablet   Yes Yes   Sig: Take 1 tablet by mouth daily      Facility-Administered Medications: None       Past Medical History:   Diagnosis Date   • Asthma    • Diabetes mellitus ([facility] [address] )    • Hyperlipidemia    • Hypertension        Past Surgical History:   Procedure Laterality Date   •  SECTION     • HYSTERECTOMY         History reviewed  No pertinent family history  I have reviewed and agree with the history as documented  E-Cigarette/Vaping   • E-Cigarette Use Never User      E-Cigarette/Vaping Substances     Social History     Tobacco Use   • Smoking status: Never Smoker   • Smokeless tobacco: Never Used   Vaping Use   • Vaping Use: Never used   Substance Use Topics   • Alcohol use: No   • Drug use: No       Review of Systems   Constitutional: Negative for chills and fever  HENT: Positive for sore throat, trouble swallowing and voice change  Negative for congestion, facial swelling and rhinorrhea  Respiratory: Negative for cough, shortness of breath, wheezing and stridor  Cardiovascular: Negative for chest pain and leg swelling  Gastrointestinal: Negative for abdominal pain, constipation, diarrhea, nausea and vomiting     Genitourinary: Negative for dysuria and flank pain  Musculoskeletal: Negative for arthralgias and myalgias  Skin: Negative for rash and wound  Neurological: Negative for dizziness, weakness, numbness and headaches  Psychiatric/Behavioral: Negative for behavioral problems  Physical Exam  Physical Exam  Vitals and nursing note reviewed  Constitutional:       General: She is not in acute distress  Appearance: She is well-developed  She is not toxic-appearing  HENT:      Head: Normocephalic and atraumatic  Nose: No congestion or rhinorrhea  Mouth/Throat:      Mouth: Angioedema present  Pharynx: Uvula midline  Pharyngeal swelling (swelling of palate ) and uvula swelling present  No oropharyngeal exudate or posterior oropharyngeal erythema  Comments: Muffled phonation  Patient tolerating oral secretions  Eyes:      Conjunctiva/sclera: Conjunctivae normal    Cardiovascular:      Rate and Rhythm: Normal rate and regular rhythm  Pulses: Normal pulses  Heart sounds: No murmur heard  Pulmonary:      Effort: Pulmonary effort is normal  No respiratory distress  Breath sounds: Normal breath sounds  No stridor  No wheezing or rales  Abdominal:      Palpations: Abdomen is soft  Tenderness: There is no abdominal tenderness  Musculoskeletal:         General: Normal range of motion  Cervical back: Neck supple  Right lower leg: No edema  Left lower leg: No edema  Skin:     General: Skin is warm and dry  Neurological:      General: No focal deficit present  Mental Status: She is alert and oriented to person, place, and time           Vital Signs  ED Triage Vitals [10/17/22 1810]   Temperature Pulse Respirations Blood Pressure SpO2   98 2 °F (36 8 °C) 70 16 167/73 100 %      Temp Source Heart Rate Source Patient Position - Orthostatic VS BP Location FiO2 (%)   Oral Monitor Sitting Right arm --      Pain Score       7           Vitals:    10/17/22 2128 10/17/22 2141 10/17/22 2145 10/17/22 2220   BP: 138/65 134/57 134/57 139/67   Pulse: 85 86 86 85   Patient Position - Orthostatic VS:   Lying          Visual Acuity      ED Medications  Medications   atorvastatin (LIPITOR) tablet 10 mg (has no administration in time range)   fluticasone (FLONASE) 50 mcg/act nasal spray 2 spray (has no administration in time range)   linaCLOtide CAPS 72 mcg (has no administration in time range)   lactated ringers infusion (75 mL/hr Intravenous New Bag 10/17/22 2240)   heparin (porcine) subcutaneous injection 5,000 Units (has no administration in time range)   methylPREDNISolone sodium succinate (Solu-MEDROL) injection 40 mg (has no administration in time range)   diphenhydrAMINE (BENADRYL) injection 25 mg (has no administration in time range)   Famotidine (PF) (PEPCID) injection 20 mg (has no administration in time range)   atenolol (TENORMIN) tablet 50 mg (has no administration in time range)     And   chlorthalidone tablet 25 mg (has no administration in time range)   methylPREDNISolone sodium succinate (Solu-MEDROL) injection 125 mg (125 mg Intravenous Given 10/17/22 1942)   tranexamic Acid 1,000 mg in sodium chloride 0 9 % 100 mL IVPB (0 mg Intravenous Stopped 10/17/22 2055)   Famotidine (PF) (PEPCID) injection 20 mg (20 mg Intravenous Given 10/17/22 1938)   diphenhydrAMINE (BENADRYL) injection 50 mg (50 mg Intravenous Given 10/17/22 1936)   EPINEPHrine PF (ADRENALIN) 1 mg/mL injection 0 3 mg (0 3 mg Intramuscular Given 10/17/22 1945)   clindamycin (CLEOCIN) IVPB (premix in dextrose) 600 mg 50 mL (600 mg Intravenous New Bag 10/17/22 2201)   iohexol (OMNIPAQUE) 350 MG/ML injection (SINGLE-DOSE) 85 mL (85 mL Intravenous Given 10/17/22 2032)       Diagnostic Studies  Results Reviewed     Procedure Component Value Units Date/Time    Manual Differential(PHLEBS Do Not Order) [[id number]]  (Abnormal) Collected: 10/17/22 1935    Lab Status: Final result Specimen: Blood from Arm, Left Updated: 10/17/22 2044 Segmented % 46 %      Lymphocytes % 51 %      Monocytes % 3 %      Eosinophils, % 0 %      Basophils % 0 %      Absolute Neutrophils 4 36 Thousand/uL      Lymphocytes Absolute 4 83 Thousand/uL      Monocytes Absolute 0 28 Thousand/uL      Eosinophils Absolute 0 00 Thousand/uL      Basophils Absolute 0 00 Thousand/uL      Total Counted --     RBC Morphology Normal     Platelet Estimate Adequate    Comprehensive metabolic panel [[id number]]  (Abnormal) Collected: 10/17/22 1935    Lab Status: Final result Specimen: Blood from Arm, Left Updated: 10/17/22 2013     Sodium 142 mmol/L      Potassium 3 7 mmol/L      Chloride 100 mmol/L      CO2 35 mmol/L      ANION GAP 7 mmol/L      BUN 17 mg/dL      Creatinine 0 69 mg/dL      Glucose 85 mg/dL      Calcium 9 6 mg/dL      AST 17 U/L      ALT 27 U/L      Alkaline Phosphatase 110 U/L      Total Protein 8 6 g/dL      Albumin 4 0 g/dL      Total Bilirubin 0 36 mg/dL      eGFR 92 ml/min/1 73sq m     Narrative:      [facility] guidelines for Chronic Kidney Disease (CKD):   •  Stage 1 with normal or high GFR (GFR > 90 mL/min/1 73 square meters)  •  Stage 2 Mild CKD (GFR = 60-89 mL/min/1 73 square meters)  •  Stage 3A Moderate CKD (GFR = 45-59 mL/min/1 73 square meters)  •  Stage 3B Moderate CKD (GFR = 30-44 mL/min/1 73 square meters)  •  Stage 4 Severe CKD (GFR = 15-29 mL/min/1 73 square meters)  •  Stage 5 End Stage CKD (GFR <15 mL/min/1 73 square meters)  Note: GFR calculation is accurate only with a steady state creatinine    CBC and differential [[id number]]  (Abnormal) Collected: 10/17/22 1935    Lab Status: Final result Specimen: Blood from Arm, Left Updated: 10/17/22 2003     WBC 9 47 Thousand/uL      RBC 4 51 Million/uL      Hemoglobin 12 3 g/dL      Hematocrit 39 0 %      MCV 87 fL      MCH 27 3 pg      MCHC 31 5 g/dL      RDW 15 3 %      MPV 12 1 fL      Platelets 488 Thousands/uL     Narrative: This is an appended report    These results have been appended to a previously verified report  CT soft tissue neck with contrast   Final Result by Stacey Mendiola MD (10/17 2116)      Mild diffuse pharyngeal soft tissue swelling/mucosal edema, nonspecific  Differential etiologies include allergic/autoimmune versus secondary to medication use i e  ACE inhibitor  Infection considered less likely given clinical history although not    entirely excluded  No discrete cervical enhancing collection identified to suggest abscess  No pathologic lymphadenopathy  Correlate clinically  Patent oropharyngeal airway  Workstation performed: [id number]                    Procedures  Procedures         ED Course  ED Course as of 10/18/22 0054   Mon Oct 17, 2022   2014 Pt reports sensation of throat closing is worse; anesthesia evaluated patient at bedside for possible intubation  They do not feel it is necessary to intubate at this time  He discussed this with the patient  Will continue to closely monitor   2016 Will give IV Clindamycin to cover infectious causes; pt reports  with recent pharyngitis    2038 On re-examination, patient reports no change in sensation, no worsening dysphagia or dyspnea  Pt in no acute distress, is tolerating oral secretions  CC saw patient at bedside; will admit pending CT      EKG: NSR at 67 BPM, normal axis, normal intervals, T wave inversion in III, no ST elevation or depression     SBIRT 22yo+    Flowsheet Row Most Recent Value   SBIRT (23 yo +)    In order to provide better care to our patients, we are screening all of our patients for alcohol and drug use  Would it be okay to ask you these screening questions? No Filed at: 10/17/2022 2003        [facility]  Number of Diagnoses or Management Options  Soft palate edema: new and requires workup  Uvulitis: new and requires workup  Diagnosis management comments: Patient is a 67-year-old female presenting for sensation tongue swelling, throat closing    Began at 2:00 p m  Today, has worsened slowly since  She does take lisinopril daily  Denies any other associated symptoms including UR symptoms, fever, chills, rash, GI symptoms  On exam, patient is in no acute distress  Vital signs stable  Patient with multiple phonation, uvular edema, and significant swelling of the soft palate  No stridor, patient is tolerating oral secretions  No respiratory distress  Case discussed with ED attending; will give epinephrine, Pepcid, Benadryl, TXA, FFP  Patient consented for blood transfusion  At the time of admission, the patient is in no acute distress  I discussed with the patient and family the diagnosis, treatment plan, and plan for admission; they were given the opportunity to ask questions and verbalized understanding  They agree with plan  Amount and/or Complexity of Data Reviewed  Clinical lab tests: ordered and reviewed  Tests in the radiology section of CPT®: ordered  Tests in the medicine section of CPT®: ordered and reviewed  Decide to obtain previous medical records or to obtain history from someone other than the patient: yes  Review and summarize past medical records: yes  Discuss the patient with other providers: yes (ED attending, anesthesia, critical care)  Independent visualization of images, tracings, or specimens: yes    Risk of Complications, Morbidity, and/or Mortality  Presenting problems: high  Management options: high    Patient Progress  Patient progress: stable      Disposition  Final diagnoses:   Uvulitis   Soft palate edema     Time reflects when diagnosis was documented in both MDM as applicable and the Disposition within this note     Time User Action Codes Description Comment    10/17/2022  8:43  [address] [facility]  3XXA] Angioedema, initial encounter     10/17/2022  8:43 PM Helnoel You Add [K12 2] Uvulitis     10/17/2022  8:44 PM Helnoel You Add [K13 79] Soft palate edema     10/17/2022  8:44 PM Helon Benes Modify [T78 3XXA] Angioedema, initial encounter     10/17/2022  8:44 PM Josefa Morales Modify [K13 79] Soft palate edema       ED Disposition     None      Follow-up Information    None         Current Discharge Medication List      CONTINUE these medications which have NOT CHANGED    Details   albuterol (PROVENTIL HFA,VENTOLIN HFA) 90 mcg/act inhaler Inhale 2 puffs every 6 (six) hours as needed      atenolol-chlorthalidone (TENORETIC) 100-25 mg per tablet Take 1 tablet by mouth daily for 14 days  Qty: 14 tablet, Refills: 0      atorvastatin (LIPITOR) 10 mg tablet Take 10 mg by mouth      calcium polycarbophil (FIBERCON) 625 mg tablet Take 625 mg by mouth      celecoxib (CeleBREX) 50 MG capsule Take 1 capsule by mouth 2 (two) times a day      DULoxetine (CYMBALTA) 30 mg delayed release capsule Take 1 capsule by mouth daily      fluticasone (FLONASE) 50 mcg/act nasal spray instill 2 sprays into each nostril once daily      isoniazid (NYDRAZID) 300 mg tablet Take 900 mg by mouth once a week      lisinopril (ZESTRIL) 5 mg tablet Take 5 mg by mouth      metFORMIN (GLUCOPHAGE) 500 mg tablet Take 500 mg by mouth 2 (two) times a day      omeprazole (PriLOSEC) 20 mg delayed release capsule Take 20 mg by mouth      pyridoxine (VITAMIN B6) 50 mg tablet Take 50 mg by mouth daily      Rifapentine (Priftin) 150 MG TABS Take 900 mg by mouth      senna-docusate sodium (SENOKOT S) 8 6-50 mg per tablet Take 1 tablet by mouth daily      linaCLOtide 72 MCG CAPS Take by mouth      naproxen (NAPROSYN) 500 mg tablet Take 1 tablet (500 mg total) by mouth 2 (two) times a day with meals for 5 days  Qty: 10 tablet, Refills: 0    Associated Diagnoses: Left knee pain; Pain and swelling of left knee             No discharge procedures on file      PDMP Review     None          ED Provider  Electronically Signed by           Sergey Serrano PA-C  10/18/22 9857

## 2022-10-18 VITALS
HEIGHT: 61 IN | RESPIRATION RATE: 20 BRPM | WEIGHT: 197.75 LBS | HEART RATE: 82 BPM | BODY MASS INDEX: 37.34 KG/M2 | TEMPERATURE: 98 F | OXYGEN SATURATION: 98 % | DIASTOLIC BLOOD PRESSURE: 49 MMHG | SYSTOLIC BLOOD PRESSURE: 121 MMHG

## 2022-10-18 PROBLEM — Z22.7 LATENT TUBERCULOSIS: Status: ACTIVE | Noted: 2022-10-18

## 2022-10-18 LAB
ABO GROUP BLD BPU: NORMAL
ANION GAP SERPL CALCULATED.3IONS-SCNC: 10 MMOL/L (ref 4–13)
BASOPHILS # BLD AUTO: 0.02 THOUSANDS/ÂΜL (ref 0–0.1)
BASOPHILS NFR BLD AUTO: 0 % (ref 0–1)
BPU ID: NORMAL
BUN SERPL-MCNC: 12 MG/DL (ref 5–25)
CALCIUM SERPL-MCNC: 9.5 MG/DL (ref 8.3–10.1)
CHLORIDE SERPL-SCNC: 101 MMOL/L (ref 96–108)
CO2 SERPL-SCNC: 29 MMOL/L (ref 21–32)
CREAT SERPL-MCNC: 0.79 MG/DL (ref 0.6–1.3)
EOSINOPHIL # BLD AUTO: 0 THOUSAND/ÂΜL (ref 0–0.61)
EOSINOPHIL NFR BLD AUTO: 0 % (ref 0–6)
ERYTHROCYTE [DISTWIDTH] IN BLOOD BY AUTOMATED COUNT: 15 % (ref 11.6–15.1)
GFR SERPL CREATININE-BSD FRML MDRD: 79 ML/MIN/1.73SQ M
GLUCOSE SERPL-MCNC: 135 MG/DL (ref 65–140)
GLUCOSE SERPL-MCNC: 145 MG/DL (ref 65–140)
GLUCOSE SERPL-MCNC: 158 MG/DL (ref 65–140)
HCT VFR BLD AUTO: 37.6 % (ref 34.8–46.1)
HGB BLD-MCNC: 12.1 G/DL (ref 11.5–15.4)
IMM GRANULOCYTES # BLD AUTO: 0.02 THOUSAND/UL (ref 0–0.2)
IMM GRANULOCYTES NFR BLD AUTO: 0 % (ref 0–2)
LYMPHOCYTES # BLD AUTO: 1.48 THOUSANDS/ÂΜL (ref 0.6–4.47)
LYMPHOCYTES NFR BLD AUTO: 17 % (ref 14–44)
MCH RBC QN AUTO: 27.5 PG (ref 26.8–34.3)
MCHC RBC AUTO-ENTMCNC: 32.2 G/DL (ref 31.4–37.4)
MCV RBC AUTO: 86 FL (ref 82–98)
MONOCYTES # BLD AUTO: 0.12 THOUSAND/ÂΜL (ref 0.17–1.22)
MONOCYTES NFR BLD AUTO: 1 % (ref 4–12)
NEUTROPHILS # BLD AUTO: 7.28 THOUSANDS/ÂΜL (ref 1.85–7.62)
NEUTS SEG NFR BLD AUTO: 82 % (ref 43–75)
NRBC BLD AUTO-RTO: 0 /100 WBCS
PLATELET # BLD AUTO: 251 THOUSANDS/UL (ref 149–390)
PMV BLD AUTO: 11.9 FL (ref 8.9–12.7)
POTASSIUM SERPL-SCNC: 3.8 MMOL/L (ref 3.5–5.3)
RBC # BLD AUTO: 4.4 MILLION/UL (ref 3.81–5.12)
SODIUM SERPL-SCNC: 140 MMOL/L (ref 135–147)
UNIT DISPENSE STATUS: NORMAL
UNIT PRODUCT CODE: NORMAL
UNIT PRODUCT VOLUME: 200 ML
UNIT RH: NORMAL
WBC # BLD AUTO: 8.92 THOUSAND/UL (ref 4.31–10.16)

## 2022-10-18 PROCEDURE — 80048 BASIC METABOLIC PNL TOTAL CA: CPT | Performed by: NURSE PRACTITIONER

## 2022-10-18 PROCEDURE — NC001 PR NO CHARGE: Performed by: INTERNAL MEDICINE

## 2022-10-18 PROCEDURE — 99239 HOSP IP/OBS DSCHRG MGMT >30: CPT | Performed by: INTERNAL MEDICINE

## 2022-10-18 PROCEDURE — 85025 COMPLETE CBC W/AUTO DIFF WBC: CPT | Performed by: NURSE PRACTITIONER

## 2022-10-18 PROCEDURE — 82948 REAGENT STRIP/BLOOD GLUCOSE: CPT

## 2022-10-18 RX ORDER — PYRIDOXINE HCL (VITAMIN B6) 50 MG
50 TABLET ORAL DAILY
Status: DISCONTINUED | OUTPATIENT
Start: 2022-10-18 | End: 2022-10-18 | Stop reason: HOSPADM

## 2022-10-18 RX ORDER — INSULIN LISPRO 100 [IU]/ML
1-6 INJECTION, SOLUTION INTRAVENOUS; SUBCUTANEOUS
Status: DISCONTINUED | OUTPATIENT
Start: 2022-10-18 | End: 2022-10-18 | Stop reason: HOSPADM

## 2022-10-18 RX ORDER — ISONIAZID 100 MG/1
300 TABLET ORAL WEEKLY
Status: DISCONTINUED | OUTPATIENT
Start: 2022-10-19 | End: 2022-10-18 | Stop reason: HOSPADM

## 2022-10-18 RX ORDER — ACETAMINOPHEN 325 MG/1
650 TABLET ORAL 4 TIMES DAILY PRN
Status: DISCONTINUED | OUTPATIENT
Start: 2022-10-18 | End: 2022-10-18 | Stop reason: HOSPADM

## 2022-10-18 RX ORDER — INSULIN LISPRO 100 [IU]/ML
1-5 INJECTION, SOLUTION INTRAVENOUS; SUBCUTANEOUS
Status: DISCONTINUED | OUTPATIENT
Start: 2022-10-18 | End: 2022-10-18 | Stop reason: HOSPADM

## 2022-10-18 RX ADMIN — FLUTICASONE PROPIONATE 2 SPRAY: 50 SPRAY, METERED NASAL at 08:43

## 2022-10-18 RX ADMIN — FAMOTIDINE 20 MG: 10 INJECTION INTRAVENOUS at 04:04

## 2022-10-18 RX ADMIN — ACETAMINOPHEN 650 MG: 325 TABLET, FILM COATED ORAL at 04:40

## 2022-10-18 RX ADMIN — PYRIDOXINE HCL TAB 50 MG 50 MG: 50 TAB at 08:44

## 2022-10-18 RX ADMIN — ATENOLOL 50 MG: 50 TABLET ORAL at 08:44

## 2022-10-18 RX ADMIN — HEPARIN SODIUM 5000 UNITS: 5000 INJECTION INTRAVENOUS; SUBCUTANEOUS at 04:40

## 2022-10-18 RX ADMIN — METHYLPREDNISOLONE SODIUM SUCCINATE 40 MG: 40 INJECTION, POWDER, FOR SOLUTION INTRAMUSCULAR; INTRAVENOUS at 04:04

## 2022-10-18 RX ADMIN — CHLORTHALIDONE 25 MG: 25 TABLET ORAL at 08:44

## 2022-10-18 NOTE — H&P
[id number] 1960, 60 y o  female MRN: [id number]  Unit/Bed#: ED 08 Encounter: [id number]  Primary Care Provider: Erin Smith MD   Date and time admitted to hospital: 10/17/2022  6:52 PM    * Angioedema  Assessment & Plan  · The patient presented with posterior soft palate/uvula edema, possibly related to her ace inhibitor use  · We will admit to step down level 1 for ongoing airway monitoring  · We will stop the ace inhibitor for now  · We will place her on solumedrol, 40 mg q 8  · We will continue IV pepcid and prn benadryl      Type 2 diabetes mellitus ([facility] [address] )  Assessment & Plan  Lab Results   Component Value Date    HGBA1C 6 9 (H) 02/01/2022       No results for input(s): POCGLU in the last 72 hours  Blood Sugar Average: Last 72 hrs:     · We will hold her metformin for now and place her on SSI   · Our goal will be to maintain her blood glucose between 140 and 180    Primary hypertension  Assessment & Plan  · We will continue her tenormin and Chlorthiazide for now  · We will stop her ace inhibitor      -------------------------------------------------------------------------------------------------------------  Chief Complaint: sore throat    History of Present Illness   HX and PE limited by: Wes Abraham is a 60 y o  female who presents with a complaint of a sore throat that began earlier in the day and got progressively worse where she felt like her throat was closing  She denies fever or chills  She reports a slight cough but no PND  She states that initially it felt painful but then she felt like it was closing  She denies pain currently except with palpation of her neck  She has had similar symptoms previously but did not seek medical are for them    History obtained from chart review and the patient   -------------------------------------------------------------------------------------------------------------  Dispo:  Admit to Stepdown Level 1    Code Status: No Order  --------------------------------------------------------------------------------------------------------------  Review of Systems    A 12-point, complete review of systems was reviewed and negative except as stated above     Physical Exam  Constitutional:       Appearance: She is obese  HENT:      Head: Normocephalic  Mouth/Throat:      Mouth: Mucous membranes are moist    Eyes:      Pupils: Pupils are equal, round, and reactive to light  Neck:      Comments: No stridor  Cardiovascular:      Rate and Rhythm: Normal rate  Pulmonary:      Effort: Pulmonary effort is normal       Breath sounds: Normal breath sounds  No stridor  Abdominal:      Palpations: Abdomen is soft  Tenderness: There is no abdominal tenderness  Musculoskeletal:         General: No swelling  Cervical back: Neck supple  Tenderness present  Skin:     General: Skin is warm and dry  Neurological:      General: No focal deficit present  Mental Status: She is oriented to person, place, and time  Mental status is at baseline        --------------------------------------------------------------------------------------------------------------  Vitals:   Vitals:    10/17/22 1810 10/17/22 1945 10/17/22 2058   BP: 167/73 120/60 124/66   BP Location: Right arm Left arm    Pulse: 70 66 85   Resp: 16 18 18   Temp: 98 2 °F (36 8 °C)  98 5 °F (36 9 °C)   TempSrc: Oral Oral    SpO2: 100% 96%    Weight: 93 6 kg (206 lb 5 6 oz)       Temp  Min: 98 2 °F (36 8 °C)  Max: 98 5 °F (36 9 °C)        Body mass index is 35 42 kg/m²      Laboratory and Diagnostics:  Results from last 7 days   Lab Units 10/17/22  1935   WBC Thousand/uL 9 47   HEMOGLOBIN g/dL 12 3   HEMATOCRIT % 39 0   PLATELETS Thousands/uL 264   MONO PCT % 3*     Results from last 7 days   Lab Units 10/17/22  1935   SODIUM mmol/L 142   POTASSIUM mmol/L 3 7   CHLORIDE mmol/L 100   CO2 mmol/L 35*   ANION GAP mmol/L 7   BUN mg/dL 17   CREATININE mg/dL 0 69   CALCIUM mg/dL 9 6   GLUCOSE RANDOM mg/dL 85   ALT U/L 27   AST U/L 17   ALK PHOS U/L 110   ALBUMIN g/dL 4 0   TOTAL BILIRUBIN mg/dL 0 36                       ABG:    VBG:          Micro:        EKG:   Imaging: I have personally reviewed pertinent reports  and I have personally reviewed pertinent films in PACS      Historical Information   Past Medical History:   Diagnosis Date   • Asthma    • Diabetes mellitus ([address] )    • Hyperlipidemia    • Hypertension      Past Surgical History:   Procedure Laterality Date   •  SECTION     • HYSTERECTOMY       Social History   Social History     Substance and Sexual Activity   Alcohol Use No     Social History     Substance and Sexual Activity   Drug Use No     Social History     Tobacco Use   Smoking Status Never Smoker   Smokeless Tobacco Never Used     Exercise History:   Family History:   History reviewed  No pertinent family history  Family history unknown and I have reviewed this patient's family history and commented on sigificant items within the HPI      Medications:  Current Facility-Administered Medications   Medication Dose Route Frequency   • clindamycin (CLEOCIN) IVPB (premix in dextrose) 600 mg 50 mL  600 mg Intravenous Once     Home medications:  Prior to Admission Medications   Prescriptions Last Dose Informant Patient Reported? Taking?    Linaclotide (LINZESS) 72 MCG CAPS   Yes No   Sig: Take by mouth   atenolol-chlorthalidone (TENORETIC) 100-25 mg per tablet   No No   Sig: Take 1 tablet by mouth daily for 14 days   atorvastatin (LIPITOR) 10 mg tablet   Yes No   Sig: Take 10 mg by mouth   calcium polycarbophil (FIBERCON) 625 mg tablet   Yes No   Sig: Take 625 mg by mouth   fluticasone (FLONASE) 50 mcg/act nasal spray   Yes No   Sig: instill 2 sprays into each nostril once daily   lisinopril (ZESTRIL) 5 mg tablet   Yes No   Sig: Take 5 mg by mouth   metFORMIN (GLUCOPHAGE) 500 mg tablet   Yes No   Sig: Take 500 mg by mouth 2 (two) times a day naproxen (NAPROSYN) 500 mg tablet   No No   Sig: Take 1 tablet (500 mg total) by mouth 2 (two) times a day with meals for 5 days   omeprazole (PRILOSEC) 20 mg delayed release capsule   Yes No   Sig: Take 20 mg by mouth      Facility-Administered Medications: None     Allergies: Allergies   Allergen Reactions   • Sulfa Antibiotics Anaphylaxis   • Flagyl [Metronidazole] Rash       ------------------------------------------------------------------------------------------------------------  Advance Directive and Living Will:      Power of :    POLST:    ------------------------------------------------------------------------------------------------------------  Anticipated Length of Stay is > 2 midnights    Care Time Delivered:   Upon my evaluation, this patient had a high probability of imminent or life-threatening deterioration due to angioedema, which required my direct attention, intervention, and personal management  I have personally provided 40 minutes (0250 to 2130) of critical care time, exclusive of procedures, teaching, family meetings, and any prior time recorded by providers other than myself  ELBERT Berg        Portions of the record may have been created with voice recognition software  Occasional wrong word or "sound a like" substitutions may have occurred due to the inherent limitations of voice recognition software    Read the chart carefully and recognize, using context, where substitutions have occurred

## 2022-10-18 NOTE — NURSING NOTE
Pt discharge paperwork reviewed with pt  All questions answered at this time  Pt d/c walking out of unit

## 2022-10-18 NOTE — ASSESSMENT & PLAN NOTE
· The patient presented with posterior soft palate/uvula edema, possibly related to her ace inhibitor use  · PPatient states this has been happening every few weeks for the last 5 years but this time felt worse than others so she came to the emergency department  · Was given 1 unit FFP in the ED, along with IM epi, 1 dose of clindamycin, and TXA  · She was transferred to the ICU for close observation    Plan  · Stop ACE-inhibitor  · Return to ED if symptoms return  · Discharge home today  · Will put in ambulatory referral allergist at discharge

## 2022-10-18 NOTE — UTILIZATION REVIEW
NOTIFICATION OF INPATIENT ADMISSION   AUTHORIZATION REQUEST   SERVICING FACILITY:   [address] [place] [address] [facility]  Tax ID: [id number]  NPI: [id number] ATTENDING PROVIDER:  Attending Name and NPI#: Luis Vargas [[id number]]  Address: [address] [place] [address]  Phone: [phone]     ADMISSION INFORMATION:  [facility] Code: 21  Inpatient Admission Date/Time: 10/17/22  8:43 PM  Discharge Date/Time: 10/18/2022 12:22 PM  Admitting Diagnosis Code/Description:  Uvulitis [K12 2]  Soft palate edema [K13 79]  Throat swelling [R22 1]  Angioedema, initial encounter [T78  3XXA]     UTILIZATION REVIEW CONTACT:  Modesta Garcia Utilization   Network Utilization Review Department  Phone: [phone]  Fax: [phone]  Email: [email or web address]  Contact for approvals/pending authorizations, clinical reviews, and discharge  PHYSICIAN ADVISORY SERVICES:  Medical Necessity Denial & Qoyi-wg-Izjr Review  Phone: [phone]  Fax: [phone]  Email: [email or web address]

## 2022-10-18 NOTE — ASSESSMENT & PLAN NOTE
· Home medications include atenolol-chlorthalidone and lisinopril    Plan:  · Continue atenolol and chlorthalidone  · Hold lisinopril as a possible cause of angioedema

## 2022-10-18 NOTE — DISCHARGE SUMMARY
[address] [facility]  Discharge- [facility] Abhishek 1960, 60 y o  female MRN: [id number]  Unit/Bed#: ICU 03 Encounter: [id number]  Primary Care Provider: Tish Caldwell MD   Date and time admitted to hospital: 10/17/2022  6:52 PM    * Angioedema  Assessment & Plan  · The patient presented with posterior soft palate/uvula edema, possibly related to her ace inhibitor use  · PPatient states this has been happening every few weeks for the last 5 years but this time felt worse than others so she came to the emergency department  · Was given 1 unit FFP in the ED, along with IM epi, 1 dose of clindamycin, and TXA  · She was transferred to the ICU for close observation    Plan  · Stop ACE-inhibitor  · Return to ED if symptoms return  · Discharge home today  · Will put in ambulatory referral allergist at discharge      Primary hypertension  Assessment & Plan  · Home medications include atenolol-chlorthalidone and lisinopril    Plan:  · Continue atenolol and chlorthalidone  · Hold lisinopril as a possible cause of angioedema    Latent tuberculosis  Assessment & Plan  · On chart review it appears the patient has a diagnosis of latent tuberculosis for which she follows with Infectious Disease at [facility]  · On last Infectious Disease note 10/10, it appears she was started on isoniazid and rifapentin along with vitamin B6 supplementation    Plan:  · Continue home meds    Type 2 diabetes mellitus ([address] )  Assessment & Plan  Lab Results   Component Value Date    HGBA1C 6 9 (H) 02/01/2022       Recent Labs     10/18/22  0724   POCGLU 145*       Blood Sugar Average: Last 72 hrs:     · Continue home medicines at discharge  · SSI with glucose checks before meals and before bedtime while hospitalized  · Maintain blood glucose between 140-180      Medical Problems             Resolved Problems  Date Reviewed: 10/18/2022   None               Discharging Resident: Beverley Kim  Discharging Attending: Bora Lala*  PCP: Meseret Workman MD  Admission Date:   Admission Orders (From admission, onward)     Ordered        10/17/22 2043  Inpatient Admission  Once                      Discharge Date: 10/18/22    Consultations During Hospital Stay:  · None    Procedures Performed:   · None    Significant Findings / Test Results:   · None    Incidental Findings:   · None     Test Results Pending at Discharge (will require follow up): · None     Outpatient Tests Requested:  · None    Complications:  None    Reason for Admission:  Angioedema    Hospital Course: Ashley Edmonds is a 60 y o  female patient who originally presented to the hospital on 10/17/2022 due to swelling in her throat  Patient reports this has been happening for 5-6 years but has been becoming more frequent and worsening in severity  Last night she had her worst episode of swelling in her throat to date and so presented to the ED  She was given IM epi, 1 dose of clindamycin, 1 unit FFP, and TXA  She was admitted to the ICU for close observation of respiratory status secondary to angioedema  She was given Solu-Medrol 40 mg q 8 hours, IV Pepcid, p r n  Benadryl  Patient feeling much better and was able to tolerate food on day of discharge  She was instructed to stop taking lisinopril as this could be a possible cause for her angioedema  She was referred the Ambulatory allergist for further evaluation of angioedema  C1 esterase could not be ordered as she was given FFP in the ED  The patient, initially admitted to the hospital as inpatient, was discharged earlier than expected given the following: Marked improvement of symptoms  Please see above list of diagnoses and related plan for additional information  Condition at Discharge: good    Discharge Day Visit / Exam:   * Please refer to separate progress note for these details *    Discussion with Family: Updated  (friend) at bedside      Discharge instructions/Information to patient and family:   See after visit summary for information provided to patient and family  Provisions for Follow-Up Care:  See after visit summary for information related to follow-up care and any pertinent home health orders  Disposition:   Home    Planned Readmission:  No    Discharge Medications:  See after visit summary for reconciled discharge medications provided to patient and/or family        **Please Note: This note may have been constructed using a voice recognition system**

## 2022-10-18 NOTE — DISCHARGE INSTR - AVS FIRST PAGE
Dear Cj Molina,     It was our pleasure to care for you here at [facility]  It is our hope that we were always able to exceed the expected standards for your care during your stay  You were hospitalized due to angioedema  You were cared for on the ICU 2nd floor by Silvino Leyva under the service of [address] [place] [address] with the [facility] who covers for your primary care physician (PCP), Brittany Lopez MD, while you were hospitalized  If you have any questions or concerns related to this hospitalization, you may contact us at [phone]  For follow up as well as any medication refills, we recommend that you follow up with your primary care physician  A registered nurse will reach out to you by phone within a few days after your discharge to answer any additional questions that you may have after going home    However, at this time we provide for you here, the most important instructions / recommendations at discharge:     Notable Medication Adjustments -   STOP taking lisinopril and any NSAIDs such as ibuprofen, naproxen, Motrin, Naprosyn, or other anti-inflammatories  Testing Required after Discharge -   None  Important follow up information -   Please follow-up with your PCP as soon as possible to evaluate your blood pressure and any changes in medicines as should no longer be taking lisinopril  A referral has been placed for you to visit with an allergist to evaluate your condition  Other Instructions -   If you experience worsening of your symptoms such as swelling in your throat, trouble breathing, shortness of breath, chest pain, hives, nausea, vomiting please visit your nearest emergency department  Please review this entire after visit summary as additional general instructions including medication list, appointments, activity, diet, any pertinent wound care, and other additional recommendations from your care team that may be provided for you        Sincerely,     Tarsha Medina

## 2022-10-18 NOTE — UTILIZATION REVIEW
Initial Clinical Review    Admission: Date/Time/Statement:   Admission Orders (From admission, onward)     Ordered        10/17/22 2043  Inpatient Admission  Once                      Orders Placed This Encounter   Procedures   • Inpatient Admission     Standing Status:   Standing     Number of Occurrences:   1     Order Specific Question:   Level of Care     Answer:   Level 1 Stepdown [13]     Order Specific Question:   Estimated length of stay     Answer:   More than 2 Midnights     Order Specific Question:   Certification     Answer:   I certify that inpatient services are medically necessary for this patient for a duration of greater than two midnights  See H&P and MD Progress Notes for additional information about the patient's course of treatment  ED Arrival Information     Expected   -    Arrival   10/17/2022 18:00    Acuity   Urgent            Means of arrival   Walk-In    Escorted by   Self    Service   Critical Care/ICU    Admission type   Emergency            Arrival complaint   throat swelling            Chief Complaint   Patient presents with   • Sore Throat     Patient complaining of sore throat, hurts to swallow, states just sitting at home and felt like she couldn't swallow, patient states 'feels like throat is getting tighter and tighter"  Initial Presentation: 60 y o  female hypertension, hyperlipidemia, diabetes mellitus, asthma  on daily lisinopril to ED urgently presents self as ICU Inpatient admission due to angioedema, HTN, DM2  Reports abrupt onset of difficulty swallowing, sensation of tongue w throat swelling /tightness  Reports increased drooling due to difficulty w swelling  EXAM  Muffled phonation, angioedema present; pharyngeal swelling of palate & uvula  Reporting sensation of swelling is now worse  CT reveals  Mild diffuse pharyngeal soft tissue swelling/mucosal edema  Currently anesthesia consult not nec for intubation      PLAN  Cont close observation for possible intubation; IV Clindamycin, give EPI, Pepcid, Benadryl, TXA, FFP  Stop ACEi now; IV Solu medrol  Cont home Tenormin & HCTZ, SSI  Date: 10/18/2022   Day 2:   Critical care  EXAM  Reports significant improvement; Afebrile; neck supple; lungs CTA  Consider hereditary angioedema vs reaction to environment / antigen; DC ACEi; req OP workup w  [id number] & allergy testing; resume OP latent TB meds at DC & encourage wt loss   OP PCP tomorrow for BP check & titration of meds if needed;   ED Triage Vitals [10/17/22 1810]   Temperature Pulse Respirations Blood Pressure SpO2   98 2 °F (36 8 °C) 70 16 167/73 100 %      Temp Source Heart Rate Source Patient Position - Orthostatic VS BP Location FiO2 (%)   Oral Monitor Sitting Right arm --      Pain Score       7          Wt Readings from Last 1 Encounters:   10/18/22 89 7 kg (197 lb 12 oz)     Additional Vital Signs:   Date/Time Temp Pulse Resp BP MAP (mmHg) SpO2 O2 Device Patient Position - Orthostatic VS   10/18/22 1105 98 °F (36 7 °C) 82 20 121/49 Abnormal  67 98 % -- --   10/18/22 0905 -- 84 20 122/72 83 99 % -- --   10/18/22 0805 -- 74 20 125/60 81 98 % -- --   10/18/22 0705 97 6 °F (36 4 °C) 64 22 114/86 105 98 % None (Room air) Lying   10/18/22 0407 -- 76 16 118/65 79 95 % -- --   10/18/22 0130 -- 68 19 123/60 86 96 % -- --   10/18/22 0030 -- 82 30 Abnormal  127/81 104 95 % -- --   10/17/22 2330 -- 78 26 Abnormal  113/60 80 94 % -- --   10/17/22 2220 -- 85 20 139/67 -- -- -- --   10/17/22 2145 -- 86 34 Abnormal  134/57 87 97 % None (Room air) Lying   10/17/22 2141 98 3 °F (36 8 °C) 86 18 134/57 -- -- -- --   10/17/22 2128 98 °F (36 7 °C) 85 18 138/65 -- -- -- --   10/17/22 2058 98 5 °F (36 9 °C) 85 18 124/66 -- -- -- --   10/17/22 2000 -- -- -- -- -- -- None (Room air) --   10/17/22 1945 -- 66 18 120/60 82 96 % None (Room air) Lying   10/17/22 1810 98 2 °F (36 8 °C) 70 16 167/73 -- 100 % None (Room air) Sitting       Weights (last 14 days)    Date/Time Weight Weight Method Height   10/18/22 0546 89 7 kg (197 lb 12 oz) Bed scale --   10/17/22 2220 89 8 kg (197 lb 15 6 oz) Bed scale 5' 1" (1 549 m)   10/17/22 1810 93 6 kg (206 lb 5 6 oz) Standing scale --       Pertinent Labs/Diagnostic Test Results:   10/17 ekg NSR  normal axis, normal intervals, T wave inversion in III, no ST elevation or depression     CT soft tissue neck with contrast   Final Result by Nory Strickland MD (10/17 2116)      Mild diffuse pharyngeal soft tissue swelling/mucosal edema, nonspecific  Differential etiologies include allergic/autoimmune versus secondary to medication use i e  ACE inhibitor  Infection considered less likely given clinical history although not    entirely excluded  No discrete cervical enhancing collection identified to suggest abscess  No pathologic lymphadenopathy  Correlate clinically  Patent oropharyngeal airway           Results from last 7 days   Lab Units 10/18/22  0422 10/17/22  2239 10/17/22  1935   WBC Thousand/uL 8 92  --  9 47   HEMOGLOBIN g/dL 12 1  --  12 3   HEMATOCRIT % 37 6  --  39 0   PLATELETS Thousands/uL 251 238 264   NEUTROS ABS Thousands/µL 7 28  --   --          Results from last 7 days   Lab Units 10/18/22  0422 10/17/22  1935   SODIUM mmol/L 140 142   POTASSIUM mmol/L 3 8 3 7   CHLORIDE mmol/L 101 100   CO2 mmol/L 29 35*   ANION GAP mmol/L 10 7   BUN mg/dL 12 17   CREATININE mg/dL 0 79 0 69   EGFR ml/min/1 73sq m 79 92   CALCIUM mg/dL 9 5 9 6     Results from last 7 days   Lab Units 10/17/22  1935   AST U/L 17   ALT U/L 27   ALK PHOS U/L 110   TOTAL PROTEIN g/dL 8 6*   ALBUMIN g/dL 4 0   TOTAL BILIRUBIN mg/dL 0 36     Results from last 7 days   Lab Units 10/18/22  1111 10/18/22  0724   POC GLUCOSE mg/dl 135 145*     Results from last 7 days   Lab Units 10/18/22  0422 10/17/22  1935   GLUCOSE RANDOM mg/dL 158* 85         ED Treatment:   Medication Administration from 10/17/2022 1759 to 10/17/2022 2219       Date/Time Order Dose Route Action     10/17/2022 1942 methylPREDNISolone sodium succinate (Solu-MEDROL) injection 125 mg 125 mg Intravenous Given     10/17/2022 1949 tranexamic Acid 1,000 mg in sodium chloride 0 9 % 100 mL IVPB 1,000 mg Intravenous New Bag     10/17/2022 1938 Famotidine (PF) (PEPCID) injection 20 mg 20 mg Intravenous Given     10/17/2022 1936 diphenhydrAMINE (BENADRYL) injection 50 mg 50 mg Intravenous Given     10/17/2022 1945 EPINEPHrine PF (ADRENALIN) 1 mg/mL injection 0 3 mg 0 3 mg Intramuscular Given     10/17/2022 2201 clindamycin (CLEOCIN) IVPB (premix in dextrose) 600 mg 50 mL 600 mg Intravenous New Bag        Past Medical History:   Diagnosis Date   • Asthma    • Diabetes mellitus ([facility] )    • Hyperlipidemia    • Hypertension      Present on Admission:  • Angioedema  • Primary hypertension  • Type 2 diabetes mellitus ([facility] )  • Latent tuberculosis      Admitting Diagnosis: Uvulitis [K12 2]  Soft palate edema [K13 79]  Throat swelling [R22 1]  Angioedema, initial encounter [T78  3XXA]  Age/Sex: 60 y o  female  Admission Orders:  Continuous cardiopulmonary & pulse oximetry  Neuro checks  Up w assist    Scheduled Medications:  atenolol, 50 mg, Oral, Daily   And  chlorthalidone, 25 mg, Oral, Daily  atorvastatin, 10 mg, Oral, Daily With Dinner  famotidine, 20 mg, Intravenous, Q12H DARLIN  fluticasone, 2 spray, Each Nare, Daily  heparin (porcine), 5,000 Units, Subcutaneous, Q8H DARLIN  insulin lispro, 1-5 Units, Subcutaneous, HS  insulin lispro, 1-6 Units, Subcutaneous, TID AC  [START ON 10/19/2022] isoniazid, 300 mg, Oral, Weekly  linaCLOtide, 72 mcg, Oral, Daily  methylPREDNISolone sodium succinate, 40 mg, Intravenous, Q8H [address]  pyridoxine, 50 mg, Oral, Daily    Continuous IV Infusions:     PRN Meds:  acetaminophen, 650 mg, Oral, 4x Daily PRN  diphenhydrAMINE, 25 mg, Intravenous, Q6H PRN      IP CONSULT TO CASE MANAGEMENT    Network Utilization Review Department  ATTENTION: Please call with any questions or concerns to [phone] and carefully listen to the prompts so that you are directed to the right person  All voicemails are confidential   [facility] all requests for admission clinical reviews, approved or denied determinations and any other requests to dedicated fax number below belonging to the campus where the patient is receiving treatment   List of dedicated fax numbers for the Facilities:  [address] DENIALS (Administrative/Medical Necessity) [phone]   [address] (Maternity/NICU/Pediatrics) [phone]   914 Rosaline Olivarez [phone]   [facility] Abigail 77 [phone]   [facility] [address] [place] [address] [facility] [phone]   [facility] 134 815 [facility] [phone]

## 2022-10-18 NOTE — ASSESSMENT & PLAN NOTE
Lab Results   Component Value Date    HGBA1C 6 9 (H) 02/01/2022       No results for input(s): POCGLU in the last 72 hours      Blood Sugar Average: Last 72 hrs:     · Hold metformin   · SSI with glucose checks before meals and before bedtime  · Maintain blood glucose between 140-180

## 2022-10-18 NOTE — ASSESSMENT & PLAN NOTE
· On chart review it appears the patient has a diagnosis of latent tuberculosis for which she follows with Infectious Disease at [facility]  · On last Infectious Disease note 10/10, it appears she was started on isoniazid and rifapentin along with vitamin B6 supplementation    Plan:  · Continue home meds

## 2022-10-18 NOTE — ASSESSMENT & PLAN NOTE
Lab Results   Component Value Date    HGBA1C 6 9 (H) 02/01/2022       Recent Labs     10/18/22  0724   POCGLU 145*       Blood Sugar Average: Last 72 hrs:     · Continue home medicines at discharge  · SSI with glucose checks before meals and before bedtime while hospitalized  · Maintain blood glucose between 140-180

## 2022-10-18 NOTE — PROGRESS NOTES
[facility] 47  Progress Note - Mary Religious 1960, 60 y o  female MRN: [id number]  Unit/Bed#: ICU 03 Encounter: [id number]  Primary Care Provider: Divina Mary MD   Date and time admitted to hospital: 10/17/2022  6:52 PM    * Angioedema  Assessment & Plan  · The patient presented with posterior soft palate/uvula edema, possibly related to her ace inhibitor use  · PPatient states this has been happening every few weeks for the last 5 years but this time felt worse than others so she came to the emergency department  · Was given 1 unit FFP in the ED, along with IM epi, 1 dose of clindamycin, and TXA  · She was transferred to the ICU for close observation    Plan  · Stop ACE-inhibitor  · Solumedrol, 40 mg q 8  · IV pepcid and prn benadryl  · Monitor respiratory status closely  · Likely transfer out of ICU today  · Consider outpatient allergist follow-up      Primary hypertension  Assessment & Plan  · Home medications include atenolol-chlorthalidone and lisinopril    Plan:  · Continue atenolol and chlorthalidone  · Hold lisinopril as a possible cause of angioedema    Latent tuberculosis  Assessment & Plan  · On chart review it appears the patient has a diagnosis of latent tuberculosis for which she follows with Infectious Disease at [facility]  · On last Infectious Disease note 10/10, it appears she was started on isoniazid and rifapentin along with vitamin B6 supplementation    Plan:  · Continue home meds    Type 2 diabetes mellitus ([facility] [address] )  Assessment & Plan  Lab Results   Component Value Date    HGBA1C 6 9 (H) 02/01/2022       Recent Labs     10/18/22  0724   POCGLU 145*       Blood Sugar Average: Last 72 hrs:     · Hold metformin   · SSI with glucose checks before meals and before bedtime  · Maintain blood glucose between 140-180    ----------------------------------------------------------------------------------------  HPI/24hr events:  No significant overnight events  Patient has been stable throughout the night  Patient appropriate for transfer out of the ICU today?: Patient does not meet criteria for ICU Follow-up Clinic; referral has not been made  Disposition: Transfer to Med-Surg   Code Status: Level 1 - Full Code  ---------------------------------------------------------------------------------------  SUBJECTIVE  Patient states she is feeling much better this morning  She says she does still feel some swelling in the back of her throat, but it is improved from yesterday  Denies any trouble breathing, chest pain, nausea/vomiting/diarrhea, itching, rashes  Review of Systems  Review of systems was reviewed and negative unless stated above in HPI/24-hour events   ---------------------------------------------------------------------------------------  OBJECTIVE    Vitals   Vitals:    10/18/22 0030 10/18/22 0130 10/18/22 0407 10/18/22 0546   BP: 127/81 123/60 118/65    BP Location:       Pulse: 82 68 76    Resp: (!) 30 19 16    Temp:       TempSrc:       SpO2: 95% 96% 95%    Weight:    89 7 kg (197 lb 12 oz)   Height:         Temp (24hrs), Av 3 °F (36 8 °C), Min:98 °F (36 7 °C), Max:98 5 °F (36 9 °C)  Current: Temperature: 98 3 °F (36 8 °C)          Respiratory:  SpO2: SpO2: 95 %       Invasive/non-invasive ventilation settings   Respiratory  Report   Lab Data (Last 4 hours)    None         O2/Vent Data (Last 4 hours)    None                Physical Exam  Constitutional:       Appearance: She is obese  She is not ill-appearing or toxic-appearing  HENT:      Head: Normocephalic and atraumatic  Right Ear: External ear normal       Left Ear: External ear normal       Mouth/Throat:      Mouth: Mucous membranes are moist       Pharynx: Oropharynx is clear  Comments: Pale, edematous soft palate  Eyes:      Extraocular Movements: Extraocular movements intact        Conjunctiva/sclera: Conjunctivae normal    Cardiovascular:      Rate and Rhythm: Normal rate and regular rhythm  Heart sounds: No murmur heard  Pulmonary:      Effort: Pulmonary effort is normal       Breath sounds: Normal breath sounds  No stridor  No wheezing, rhonchi or rales  Abdominal:      General: Abdomen is flat  Palpations: Abdomen is soft  Tenderness: There is no abdominal tenderness  Musculoskeletal:      Right lower leg: No edema  Left lower leg: No edema  Skin:     General: Skin is warm and dry  Neurological:      Mental Status: She is alert and oriented to person, place, and time  Psychiatric:         Mood and Affect: Mood normal          Behavior: Behavior normal          Thought Content: Thought content normal          Judgment: Judgment normal              Laboratory and Diagnostics:  Results from last 7 days   Lab Units 10/18/22  0422 10/17/22  2239 10/17/22  1935   WBC Thousand/uL 8 92  --  9 47   HEMOGLOBIN g/dL 12 1  --  12 3   HEMATOCRIT % 37 6  --  39 0   PLATELETS Thousands/uL 251 238 264   NEUTROS PCT % 82*  --   --    MONOS PCT % 1*  --   --    MONO PCT %  --   --  3*     Results from last 7 days   Lab Units 10/18/22  0422 10/17/22  1935   SODIUM mmol/L 140 142   POTASSIUM mmol/L 3 8 3 7   CHLORIDE mmol/L 101 100   CO2 mmol/L 29 35*   ANION GAP mmol/L 10 7   BUN mg/dL 12 17   CREATININE mg/dL 0 79 0 69   CALCIUM mg/dL 9 5 9 6   GLUCOSE RANDOM mg/dL 158* 85   ALT U/L  --  27   AST U/L  --  17   ALK PHOS U/L  --  110   ALBUMIN g/dL  --  4 0   TOTAL BILIRUBIN mg/dL  --  0 36                       ABG:    VBG:          Micro        Imaging: I have personally reviewed pertinent reports  Intake and Output  I/O       10/16 0701  10/17 0700 10/17 0701  10/18 0700    Blood  300    IV Piggyback  100    Total Intake(mL/kg)  400 (4 5)    Net  +400          Unmeasured Urine Occurrence  1 x          Height and Weights   Height: 5' 1" (154 9 cm)     Body mass index is 37 37 kg/m²    Weight (last 2 days)     Date/Time Weight    10/18/22 0546 89 7 (197 75) 10/17/22 2220 89 8 (197 97)    10/17/22 1810 93 6 (206 35)            Nutrition       Diet Orders   (From admission, onward)             Start     Ordered    10/18/22 0526  Diet Ezequiel/CHO Controlled; Consistent Carbohydrate Diet Level 2 (5 carb servings/75 grams CHO/meal)  Diet effective now        References:    Nutrtion Support Algorithm Enteral vs  Parenteral   Question Answer Comment   Diet Type Ezeqiuel/CHO Controlled    Ezequiel/CHO Controlled Consistent Carbohydrate Diet Level 2 (5 carb servings/75 grams CHO/meal)    RD to adjust diet per protocol?  Yes        10/18/22 0526                  Active Medications  Scheduled Meds:  Current Facility-Administered Medications   Medication Dose Route Frequency Provider Last Rate   • acetaminophen  650 mg Oral 4x Daily PRN Thurlow Cockayne, CRNP     • atenolol  50 mg Oral Daily Thurlow Cockayne, CRNP      And   • chlorthalidone  25 mg Oral Daily Thurlow Cockayne, CRNP     • atorvastatin  10 mg Oral Daily With ELBERT Gonzales     • diphenhydrAMINE  25 mg Intravenous Q6H PRN Thurlow Cockayne, CRNP     • famotidine  20 mg Intravenous Q12H [address] [facility] [address], CRNP     • fluticasone  2 spray Each Nare Daily Thurlow Cockayne, CRNP     • heparin (porcine)  5,000 Units Subcutaneous Q8H [address], CRNP     • insulin lispro  1-5 Units Subcutaneous HS Thurlow Cockayne, CRNP     • insulin lispro  1-6 Units Subcutaneous TID AC Thurlow Cockayne, CRNP     • [START ON 10/19/2022] isoniazid  300 mg Oral Weekly Sang Lopes DO     • linaCLOtide  72 mcg Oral Daily Thurlow Cockayne, CRNP     • methylPREDNISolone sodium succinate  40 mg Intravenous [facility] Thurlow Cockayne, CRNP     • pyridoxine  50 mg Oral Daily Bebo Lerma DO       Continuous Infusions:     PRN Meds:   acetaminophen, 650 mg, 4x Daily PRN  diphenhydrAMINE, 25 mg, Q6H PRN        Invasive Devices Review  Invasive Devices  Report    Peripheral Intravenous Line  Duration           Peripheral IV 10/17/22 Right Antecubital <1 day                Rationale for remaining devices:  Medically necessary  ---------------------------------------------------------------------------------------  Advance Directive and Living Will:      Power of :    POLST:    ---------------------------------------------------------------------------------------  Care Time Delivered:   No Critical Care time spent       Kings Ferguson,       Portions of the record may have been created with voice recognition software  Occasional wrong word or "sound a like" substitutions may have occurred due to the inherent limitations of voice recognition software    Read the chart carefully and recognize, using context, where substitutions have occurred

## 2022-10-18 NOTE — PLAN OF CARE
Problem: RESPIRATORY - ADULT  Goal: Achieves optimal ventilation and oxygenation  Description: INTERVENTIONS:  - Assess for changes in respiratory status  - Assess for changes in mentation and behavior  - Position to facilitate oxygenation and minimize respiratory effort  - Oxygen administered by appropriate delivery if ordered  - Initiate smoking cessation education as indicated  - Encourage broncho-pulmonary hygiene including cough, deep breathe, Incentive Spirometry  - Assess the need for suctioning and aspirate as needed  - Assess and instruct to report SOB or any respiratory difficulty  - Respiratory Therapy support as indicated  Outcome: Progressing     Problem: DISCHARGE PLANNING  Goal: Discharge to home or other facility with appropriate resources  Description: INTERVENTIONS:  - Identify barriers to discharge w/patient and caregiver  - Arrange for needed discharge resources and transportation as appropriate  - Identify discharge learning needs (meds, wound care, etc )  - Arrange for interpretive services to assist at discharge as needed  - Refer to Case Management Department for coordinating discharge planning if the patient needs post-hospital services based on physician/advanced practitioner order or complex needs related to functional status, cognitive ability, or social support system  Outcome: Progressing

## 2022-10-18 NOTE — ASSESSMENT & PLAN NOTE
· The patient presented with posterior soft palate/uvula edema, possibly related to her ace inhibitor use  · We will admit to step down level 1 for ongoing airway monitoring  · We will stop the ace inhibitor for now  · We will place her on solumedrol, 40 mg q 8  · We will continue IV pepcid and prn benadryl

## 2022-10-18 NOTE — ASSESSMENT & PLAN NOTE
· On chart review it appears the patient has a diagnosis of latent tuberculosis for which she follows with Infectious Disease at [facility]  · On last Infectious Disease note 10/10, it appears she was started on isoniazid and rifapentin along with vitamin B6 supplementation    Plan:  · Continue home meds

## 2022-10-18 NOTE — ASSESSMENT & PLAN NOTE
· The patient presented with posterior soft palate/uvula edema, possibly related to her ace inhibitor use  · PPatient states this has been happening every few weeks for the last 5 years but this time felt worse than others so she came to the emergency department  · Was given 1 unit FFP in the ED, along with IM epi, 1 dose of clindamycin, and TXA  · She was transferred to the ICU for close observation    Plan  · Stop ACE-inhibitor  · Solumedrol, 40 mg q 8  · IV pepcid and prn benadryl  · Monitor respiratory status closely

## 2022-10-18 NOTE — ASSESSMENT & PLAN NOTE
Lab Results   Component Value Date    HGBA1C 6 9 (H) 02/01/2022       No results for input(s): POCGLU in the last 72 hours      Blood Sugar Average: Last 72 hrs:     · We will hold her metformin for now and place her on SSI   · Our goal will be to maintain her blood glucose between 140 and 180